# Patient Record
Sex: FEMALE | Race: WHITE | NOT HISPANIC OR LATINO | Employment: FULL TIME
[De-identification: names, ages, dates, MRNs, and addresses within clinical notes are randomized per-mention and may not be internally consistent; named-entity substitution may affect disease eponyms.]

---

## 2017-10-31 ENCOUNTER — HOSPITAL (OUTPATIENT)
Dept: OTHER | Age: 52
End: 2017-10-31
Attending: OBSTETRICS & GYNECOLOGY

## 2018-11-01 ENCOUNTER — HOSPITAL (OUTPATIENT)
Dept: OTHER | Age: 53
End: 2018-11-01
Attending: OBSTETRICS & GYNECOLOGY

## 2019-01-10 ENCOUNTER — HOSPITAL (OUTPATIENT)
Dept: OTHER | Age: 54
End: 2019-01-10
Attending: OBSTETRICS & GYNECOLOGY

## 2019-02-01 ENCOUNTER — DIAGNOSTIC TRANS (OUTPATIENT)
Dept: OTHER | Age: 54
End: 2019-02-01

## 2019-02-04 ENCOUNTER — HOSPITAL (OUTPATIENT)
Dept: OTHER | Age: 54
End: 2019-02-04

## 2019-02-05 ENCOUNTER — HOSPITAL (OUTPATIENT)
Dept: OTHER | Age: 54
End: 2019-02-05
Attending: OBSTETRICS & GYNECOLOGY

## 2019-02-05 LAB
HGB BLD-MCNC: 14.3 G/DL (ref 12–15.5)
HGB BLD-MCNC: 14.3 GM/DL (ref 12–15.5)

## 2019-02-06 LAB — PATHOLOGIST NAME: NORMAL

## 2019-11-06 ENCOUNTER — HOSPITAL (OUTPATIENT)
Dept: OTHER | Age: 54
End: 2019-11-06
Attending: OBSTETRICS & GYNECOLOGY

## 2019-11-12 ENCOUNTER — HOSPITAL (OUTPATIENT)
Dept: OTHER | Age: 54
End: 2019-11-12
Attending: OBSTETRICS & GYNECOLOGY

## 2020-12-03 ENCOUNTER — HOSPITAL ENCOUNTER (OUTPATIENT)
Dept: MAMMOGRAPHY | Age: 55
Discharge: HOME | End: 2020-12-03
Attending: OBSTETRICS & GYNECOLOGY

## 2020-12-03 DIAGNOSIS — Z12.31 ENCOUNTER FOR SCREENING MAMMOGRAM FOR MALIGNANT NEOPLASM OF BREAST: ICD-10-CM

## 2020-12-03 PROCEDURE — 77063 BREAST TOMOSYNTHESIS BI: CPT

## 2020-12-23 PROCEDURE — 88175 CYTOPATH C/V AUTO FLUID REDO: CPT | Performed by: CLINICAL MEDICAL LABORATORY

## 2020-12-23 PROCEDURE — 87624 HPV HI-RISK TYP POOLED RSLT: CPT | Performed by: CLINICAL MEDICAL LABORATORY

## 2020-12-24 ENCOUNTER — LAB REQUISITION (OUTPATIENT)
Dept: LAB | Age: 55
End: 2020-12-24

## 2020-12-24 DIAGNOSIS — Z12.4 ENCOUNTER FOR SCREENING FOR MALIGNANT NEOPLASM OF CERVIX: ICD-10-CM

## 2020-12-30 LAB
CASE RPRT: NORMAL
CYTOLOGY CVX/VAG STUDY: NORMAL
HPV16+18+45 E6+E7MRNA CVX NAA+PROBE: NEGATIVE
Lab: NORMAL
PAP EDUCATIONAL NOTE: NORMAL
SPECIMEN ADEQUACY: NORMAL

## 2021-01-01 ENCOUNTER — EXTERNAL RECORD (OUTPATIENT)
Dept: OTHER | Age: 56
End: 2021-01-01

## 2021-02-04 PROBLEM — L65.9 HAIR LOSS: Status: ACTIVE | Noted: 2021-02-04

## 2021-03-22 ENCOUNTER — EMPLOYEE HEALTH (OUTPATIENT)
Dept: OTHER | Age: 56
End: 2021-03-22

## 2021-03-22 DIAGNOSIS — Z20.822 SUSPECTED COVID-19 VIRUS INFECTION: Primary | ICD-10-CM

## 2021-03-22 LAB
SARS-COV-2 RNA SPEC QL NAA+PROBE: DETECTED
SPECIMEN SOURCE: ABNORMAL

## 2021-03-24 ENCOUNTER — EMPLOYEE HEALTH (OUTPATIENT)
Dept: OTHER | Age: 56
End: 2021-03-24

## 2021-03-28 ENCOUNTER — EMPLOYEE HEALTH (OUTPATIENT)
Dept: OTHER | Age: 56
End: 2021-03-28

## 2021-03-31 ENCOUNTER — EMPLOYEE HEALTH (OUTPATIENT)
Dept: OTHER | Age: 56
End: 2021-03-31

## 2021-08-16 ENCOUNTER — OFFICE VISIT (OUTPATIENT)
Dept: SURGERY | Facility: CLINIC | Age: 56
End: 2021-08-16
Payer: OTHER MISCELLANEOUS

## 2021-08-16 DIAGNOSIS — M79.641 PAIN IN RIGHT HAND: Primary | ICD-10-CM

## 2021-08-16 DIAGNOSIS — S63.501A SPRAIN OF RIGHT WRIST, INITIAL ENCOUNTER: ICD-10-CM

## 2021-08-16 PROCEDURE — 99204 OFFICE O/P NEW MOD 45 MIN: CPT | Performed by: PLASTIC SURGERY

## 2021-08-16 RX ORDER — INDOMETHACIN 25 MG/1
25 CAPSULE ORAL
Qty: 63 CAPSULE | Refills: 0 | Status: SHIPPED | OUTPATIENT
Start: 2021-08-16 | End: 2021-09-06

## 2021-08-16 NOTE — H&P
Zhen Gonsales is a 55year old female that presents with Patient presents with: Injury: Right hand      REFERRED BY:  No ref.  provider found    Pacemaker: No  Latex Allergy: no  Coumadin: No  Plavix: No  Other anticoagulants: No  Cardiac stents: No    HAND hand and forearm brought on by certain movement. Pt complains of numbness and tingling sensation, continues to wear wrist brace at night that is somewhat effective. Pt is on modified duty at work.            PMH:     MEDICAL  Past Medical History:   Diagnos wrist TFCC  Mild carpal tunnel    We discussed what a sprain is and the treatment options. This area may remain painful and stiff for a very long time (months, and in rare cases, permanently).   Questions were answered and the patient wishes to proceed with

## 2021-08-17 ENCOUNTER — TELEPHONE (OUTPATIENT)
Dept: SURGERY | Facility: CLINIC | Age: 56
End: 2021-08-17

## 2021-08-17 NOTE — TELEPHONE ENCOUNTER
We received a fax stating a prior authorization had been started for pt's Indomethacin, to submit the PA.   Spoke to Memo at [facility] pharmacy he stated Indomethacin that Dr Juan dominguez was ready for  and had been authorized, no further

## 2021-10-14 DIAGNOSIS — Z11.52 ENCOUNTER FOR SCREENING FOR COVID-19: ICD-10-CM

## 2021-10-16 ENCOUNTER — HOSPITAL ENCOUNTER (OUTPATIENT)
Dept: LAB | Age: 56
Discharge: STILL ADMITTED OR ADMINISTRATIVE | End: 2021-10-16
Attending: PREVENTIVE MEDICINE

## 2021-10-16 DIAGNOSIS — Z11.52 ENCOUNTER FOR SCREENING FOR COVID-19: ICD-10-CM

## 2021-10-16 LAB
SARS-COV-2 RNA RESP QL NAA+PROBE: NOT DETECTED
SERVICE CMNT-IMP: NORMAL
SERVICE CMNT-IMP: NORMAL

## 2021-10-16 PROCEDURE — U0005 INFEC AGEN DETEC AMPLI PROBE: HCPCS | Performed by: PSYCHIATRY & NEUROLOGY

## 2021-10-16 PROCEDURE — U0003 INFECTIOUS AGENT DETECTION BY NUCLEIC ACID (DNA OR RNA); SEVERE ACUTE RESPIRATORY SYNDROME CORONAVIRUS 2 (SARS-COV-2) (CORONAVIRUS DISEASE [COVID-19]), AMPLIFIED PROBE TECHNIQUE, MAKING USE OF HIGH THROUGHPUT TECHNOLOGIES AS DESCRIBED BY CMS-2020-01-R: HCPCS | Performed by: PSYCHIATRY & NEUROLOGY

## 2021-10-22 ENCOUNTER — TELEPHONE (OUTPATIENT)
Dept: SURGERY | Facility: CLINIC | Age: 56
End: 2021-10-22

## 2021-10-22 NOTE — TELEPHONE ENCOUNTER
Spoke with the patient, Dr. Riley Mitchell has reviewed her chart and recommends to see physiatry. Appointment canceled with our office. Pt has scheduled appointment 11/11 with physiatry.

## 2021-10-25 PROCEDURE — U0005 INFEC AGEN DETEC AMPLI PROBE: HCPCS | Performed by: PSYCHIATRY & NEUROLOGY

## 2021-10-25 PROCEDURE — U0003 INFECTIOUS AGENT DETECTION BY NUCLEIC ACID (DNA OR RNA); SEVERE ACUTE RESPIRATORY SYNDROME CORONAVIRUS 2 (SARS-COV-2) (CORONAVIRUS DISEASE [COVID-19]), AMPLIFIED PROBE TECHNIQUE, MAKING USE OF HIGH THROUGHPUT TECHNOLOGIES AS DESCRIBED BY CMS-2020-01-R: HCPCS | Performed by: PSYCHIATRY & NEUROLOGY

## 2021-10-26 ENCOUNTER — HOSPITAL ENCOUNTER (OUTPATIENT)
Dept: LAB | Age: 56
Discharge: STILL ADMITTED OR ADMINISTRATIVE | End: 2021-10-26
Attending: PREVENTIVE MEDICINE

## 2021-10-26 DIAGNOSIS — Z11.52 ENCOUNTER FOR SCREENING FOR COVID-19: ICD-10-CM

## 2021-10-26 LAB
SARS-COV-2 RNA RESP QL NAA+PROBE: NOT DETECTED
SERVICE CMNT-IMP: NORMAL
SERVICE CMNT-IMP: NORMAL

## 2021-11-01 ENCOUNTER — HOSPITAL ENCOUNTER (OUTPATIENT)
Dept: LAB | Age: 56
Discharge: STILL ADMITTED OR ADMINISTRATIVE | End: 2021-11-01
Attending: PREVENTIVE MEDICINE

## 2021-11-01 ENCOUNTER — OFFICE VISIT (OUTPATIENT)
Dept: PHYSICAL MEDICINE AND REHAB | Facility: CLINIC | Age: 56
End: 2021-11-01
Payer: COMMERCIAL

## 2021-11-01 ENCOUNTER — TELEPHONE (OUTPATIENT)
Dept: NEUROLOGY | Facility: CLINIC | Age: 56
End: 2021-11-01

## 2021-11-01 VITALS
WEIGHT: 190 LBS | HEART RATE: 87 BPM | DIASTOLIC BLOOD PRESSURE: 84 MMHG | HEIGHT: 62 IN | SYSTOLIC BLOOD PRESSURE: 120 MMHG | BODY MASS INDEX: 34.96 KG/M2 | OXYGEN SATURATION: 96 %

## 2021-11-01 DIAGNOSIS — G56.01 CARPAL TUNNEL SYNDROME OF RIGHT WRIST: Primary | ICD-10-CM

## 2021-11-01 DIAGNOSIS — Z11.52 ENCOUNTER FOR SCREENING FOR COVID-19: ICD-10-CM

## 2021-11-01 PROCEDURE — U0005 INFEC AGEN DETEC AMPLI PROBE: HCPCS | Performed by: PSYCHIATRY & NEUROLOGY

## 2021-11-01 PROCEDURE — U0003 INFECTIOUS AGENT DETECTION BY NUCLEIC ACID (DNA OR RNA); SEVERE ACUTE RESPIRATORY SYNDROME CORONAVIRUS 2 (SARS-COV-2) (CORONAVIRUS DISEASE [COVID-19]), AMPLIFIED PROBE TECHNIQUE, MAKING USE OF HIGH THROUGHPUT TECHNOLOGIES AS DESCRIBED BY CMS-2020-01-R: HCPCS | Performed by: PSYCHIATRY & NEUROLOGY

## 2021-11-01 PROCEDURE — 20526 THER INJECTION CARP TUNNEL: CPT | Performed by: PHYSICAL MEDICINE & REHABILITATION

## 2021-11-01 PROCEDURE — 3074F SYST BP LT 130 MM HG: CPT | Performed by: PHYSICAL MEDICINE & REHABILITATION

## 2021-11-01 PROCEDURE — 76942 ECHO GUIDE FOR BIOPSY: CPT | Performed by: PHYSICAL MEDICINE & REHABILITATION

## 2021-11-01 PROCEDURE — 99244 OFF/OP CNSLTJ NEW/EST MOD 40: CPT | Performed by: PHYSICAL MEDICINE & REHABILITATION

## 2021-11-01 PROCEDURE — 3008F BODY MASS INDEX DOCD: CPT | Performed by: PHYSICAL MEDICINE & REHABILITATION

## 2021-11-01 PROCEDURE — 3079F DIAST BP 80-89 MM HG: CPT | Performed by: PHYSICAL MEDICINE & REHABILITATION

## 2021-11-01 RX ORDER — LIDOCAINE HYDROCHLORIDE 10 MG/ML
1.5 INJECTION, SOLUTION INFILTRATION; PERINEURAL ONCE
Status: COMPLETED | OUTPATIENT
Start: 2021-11-01 | End: 2021-11-01

## 2021-11-01 RX ORDER — TRIAMCINOLONE ACETONIDE 40 MG/ML
20 INJECTION, SUSPENSION INTRA-ARTICULAR; INTRAMUSCULAR ONCE
Status: COMPLETED | OUTPATIENT
Start: 2021-11-01 | End: 2021-11-01

## 2021-11-01 NOTE — PATIENT INSTRUCTIONS
-Start occupational therapy and home exercises  -Continue night splints  -Ice/Heat as tolerated  -Topical Voltaren gel as needed  -If no better will consider MRI for further evaluation and EMG test  -Follow up in 4 weeks    Steroid Injection Information  W

## 2021-11-01 NOTE — TELEPHONE ENCOUNTER
[employer] for authorization of approval for  Ultrasound guided right carpal tunnel injection with corticosteroid cpt codes 36885, [id number], . Authorization is not required. Transaction ID: [id number]. Will inform Nursing.

## 2021-11-02 PROBLEM — G56.01 CARPAL TUNNEL SYNDROME OF RIGHT WRIST: Status: ACTIVE | Noted: 2021-11-02

## 2021-11-02 LAB
SARS-COV-2 RNA RESP QL NAA+PROBE: NOT DETECTED
SERVICE CMNT-IMP: NORMAL
SERVICE CMNT-IMP: NORMAL

## 2021-11-02 NOTE — PROCEDURES
Procedure:  Ultrasound guided RIGHT carpal tunnel injection  The risks, benefits and anticipated outcomes of the procedure, the risks and benefits of the alternatives to the procedure, and the roles and tasks of the personnel to be involved, were discussed

## 2021-11-02 NOTE — PROGRESS NOTES
[address] [facility]  NEW PATIENT EVALUATION    Consultation as a request of Dr. Chayito Ulrich    Chief Complaint: Hand pain    HISTORY OF PRESENT ILLNESS:   Patient presents with:  Hand Pain: New R handed patient,  2/10 Family History   Problem Relation Age of Onset   • Hypertension Mother           SOCIAL HISTORY:   Social History    Tobacco Use      Smoking status: Never Smoker      Smokeless tobacco: Never Used    Alcohol use: No    Drug use: No         REVIEW OF S Exam:    WRIST:  Inspection: no erythema, swelling, or obvious deformity  Palpation: no ttp in carpal bones or areas of pain  ROM: intact to all planes of motion  Strength: 5/5  Sensation: Intact to light touch in all dermatomes of the lower extremities  T barriers to learning. Nicolle ZALDIVAR [address] [facility]  Physical Medicine and Rehabilitation/Sports Medicine

## 2021-11-05 ENCOUNTER — HOSPITAL ENCOUNTER (OUTPATIENT)
Dept: LAB | Age: 56
Discharge: STILL ADMITTED OR ADMINISTRATIVE | End: 2021-11-05
Attending: PREVENTIVE MEDICINE

## 2021-11-05 DIAGNOSIS — Z11.52 ENCOUNTER FOR SCREENING FOR COVID-19: ICD-10-CM

## 2021-11-05 PROCEDURE — U0003 INFECTIOUS AGENT DETECTION BY NUCLEIC ACID (DNA OR RNA); SEVERE ACUTE RESPIRATORY SYNDROME CORONAVIRUS 2 (SARS-COV-2) (CORONAVIRUS DISEASE [COVID-19]), AMPLIFIED PROBE TECHNIQUE, MAKING USE OF HIGH THROUGHPUT TECHNOLOGIES AS DESCRIBED BY CMS-2020-01-R: HCPCS | Performed by: PSYCHIATRY & NEUROLOGY

## 2021-11-05 PROCEDURE — U0005 INFEC AGEN DETEC AMPLI PROBE: HCPCS | Performed by: PSYCHIATRY & NEUROLOGY

## 2021-11-06 LAB
SARS-COV-2 RNA RESP QL NAA+PROBE: NOT DETECTED
SERVICE CMNT-IMP: NORMAL
SERVICE CMNT-IMP: NORMAL

## 2021-11-11 ENCOUNTER — MED REC SCAN ONLY (OUTPATIENT)
Dept: PHYSICAL MEDICINE AND REHAB | Facility: CLINIC | Age: 56
End: 2021-11-11

## 2021-11-12 ENCOUNTER — HOSPITAL ENCOUNTER (OUTPATIENT)
Dept: LAB | Age: 56
Discharge: STILL ADMITTED OR ADMINISTRATIVE | End: 2021-11-12
Attending: PREVENTIVE MEDICINE

## 2021-11-12 DIAGNOSIS — Z11.52 ENCOUNTER FOR SCREENING FOR COVID-19: ICD-10-CM

## 2021-11-12 LAB
SARS-COV-2 RNA RESP QL NAA+PROBE: NOT DETECTED
SERVICE CMNT-IMP: NORMAL
SERVICE CMNT-IMP: NORMAL

## 2021-11-12 PROCEDURE — U0005 INFEC AGEN DETEC AMPLI PROBE: HCPCS | Performed by: PSYCHIATRY & NEUROLOGY

## 2021-11-12 PROCEDURE — U0003 INFECTIOUS AGENT DETECTION BY NUCLEIC ACID (DNA OR RNA); SEVERE ACUTE RESPIRATORY SYNDROME CORONAVIRUS 2 (SARS-COV-2) (CORONAVIRUS DISEASE [COVID-19]), AMPLIFIED PROBE TECHNIQUE, MAKING USE OF HIGH THROUGHPUT TECHNOLOGIES AS DESCRIBED BY CMS-2020-01-R: HCPCS | Performed by: PSYCHIATRY & NEUROLOGY

## 2021-11-17 ENCOUNTER — HOSPITAL ENCOUNTER (OUTPATIENT)
Dept: MAMMOGRAPHY | Age: 56
Discharge: HOME | End: 2021-11-17
Attending: OBSTETRICS & GYNECOLOGY

## 2021-11-17 DIAGNOSIS — Z12.31 ENCOUNTER FOR SCREENING MAMMOGRAM FOR MALIGNANT NEOPLASM OF BREAST: ICD-10-CM

## 2021-11-17 PROCEDURE — 77063 BREAST TOMOSYNTHESIS BI: CPT

## 2021-11-20 ENCOUNTER — HOSPITAL ENCOUNTER (OUTPATIENT)
Dept: LAB | Age: 56
Discharge: STILL ADMITTED OR ADMINISTRATIVE | End: 2021-11-20
Attending: PREVENTIVE MEDICINE

## 2021-11-20 DIAGNOSIS — Z11.52 ENCOUNTER FOR SCREENING FOR COVID-19: ICD-10-CM

## 2021-11-20 LAB
SARS-COV-2 RNA RESP QL NAA+PROBE: NOT DETECTED
SERVICE CMNT-IMP: NORMAL
SERVICE CMNT-IMP: NORMAL

## 2021-11-20 PROCEDURE — U0005 INFEC AGEN DETEC AMPLI PROBE: HCPCS | Performed by: PSYCHIATRY & NEUROLOGY

## 2021-11-20 PROCEDURE — U0003 INFECTIOUS AGENT DETECTION BY NUCLEIC ACID (DNA OR RNA); SEVERE ACUTE RESPIRATORY SYNDROME CORONAVIRUS 2 (SARS-COV-2) (CORONAVIRUS DISEASE [COVID-19]), AMPLIFIED PROBE TECHNIQUE, MAKING USE OF HIGH THROUGHPUT TECHNOLOGIES AS DESCRIBED BY CMS-2020-01-R: HCPCS | Performed by: PSYCHIATRY & NEUROLOGY

## 2021-11-26 ENCOUNTER — HOSPITAL ENCOUNTER (OUTPATIENT)
Dept: LAB | Age: 56
Discharge: STILL ADMITTED OR ADMINISTRATIVE | End: 2021-11-26
Attending: PREVENTIVE MEDICINE

## 2021-11-26 DIAGNOSIS — Z11.52 ENCOUNTER FOR SCREENING FOR COVID-19: ICD-10-CM

## 2021-11-26 PROCEDURE — U0005 INFEC AGEN DETEC AMPLI PROBE: HCPCS | Performed by: PSYCHIATRY & NEUROLOGY

## 2021-11-26 PROCEDURE — U0003 INFECTIOUS AGENT DETECTION BY NUCLEIC ACID (DNA OR RNA); SEVERE ACUTE RESPIRATORY SYNDROME CORONAVIRUS 2 (SARS-COV-2) (CORONAVIRUS DISEASE [COVID-19]), AMPLIFIED PROBE TECHNIQUE, MAKING USE OF HIGH THROUGHPUT TECHNOLOGIES AS DESCRIBED BY CMS-2020-01-R: HCPCS | Performed by: PSYCHIATRY & NEUROLOGY

## 2021-11-27 LAB
SARS-COV-2 RNA RESP QL NAA+PROBE: NOT DETECTED
SERVICE CMNT-IMP: NORMAL
SERVICE CMNT-IMP: NORMAL

## 2021-12-03 ENCOUNTER — HOSPITAL ENCOUNTER (OUTPATIENT)
Dept: LAB | Age: 56
Discharge: STILL ADMITTED OR ADMINISTRATIVE | End: 2021-12-03
Attending: PREVENTIVE MEDICINE

## 2021-12-03 DIAGNOSIS — Z11.52 ENCOUNTER FOR SCREENING FOR COVID-19: ICD-10-CM

## 2021-12-03 LAB
SARS-COV-2 RNA RESP QL NAA+PROBE: NOT DETECTED
SERVICE CMNT-IMP: NORMAL
SERVICE CMNT-IMP: NORMAL

## 2021-12-03 PROCEDURE — U0005 INFEC AGEN DETEC AMPLI PROBE: HCPCS | Performed by: PSYCHIATRY & NEUROLOGY

## 2021-12-03 PROCEDURE — U0003 INFECTIOUS AGENT DETECTION BY NUCLEIC ACID (DNA OR RNA); SEVERE ACUTE RESPIRATORY SYNDROME CORONAVIRUS 2 (SARS-COV-2) (CORONAVIRUS DISEASE [COVID-19]), AMPLIFIED PROBE TECHNIQUE, MAKING USE OF HIGH THROUGHPUT TECHNOLOGIES AS DESCRIBED BY CMS-2020-01-R: HCPCS | Performed by: PSYCHIATRY & NEUROLOGY

## 2021-12-11 ENCOUNTER — HOSPITAL ENCOUNTER (OUTPATIENT)
Dept: LAB | Age: 56
Discharge: STILL ADMITTED OR ADMINISTRATIVE | End: 2021-12-11
Attending: PREVENTIVE MEDICINE

## 2021-12-11 DIAGNOSIS — Z11.52 ENCOUNTER FOR SCREENING FOR COVID-19: ICD-10-CM

## 2021-12-11 PROCEDURE — U0003 INFECTIOUS AGENT DETECTION BY NUCLEIC ACID (DNA OR RNA); SEVERE ACUTE RESPIRATORY SYNDROME CORONAVIRUS 2 (SARS-COV-2) (CORONAVIRUS DISEASE [COVID-19]), AMPLIFIED PROBE TECHNIQUE, MAKING USE OF HIGH THROUGHPUT TECHNOLOGIES AS DESCRIBED BY CMS-2020-01-R: HCPCS | Performed by: PSYCHIATRY & NEUROLOGY

## 2021-12-11 PROCEDURE — U0005 INFEC AGEN DETEC AMPLI PROBE: HCPCS | Performed by: PSYCHIATRY & NEUROLOGY

## 2021-12-12 LAB
SARS-COV-2 RNA RESP QL NAA+PROBE: NOT DETECTED
SERVICE CMNT-IMP: NORMAL
SERVICE CMNT-IMP: NORMAL

## 2021-12-21 ENCOUNTER — LAB REQUISITION (OUTPATIENT)
Dept: LAB | Age: 56
End: 2021-12-21

## 2021-12-21 DIAGNOSIS — Z12.4 ENCOUNTER FOR SCREENING FOR MALIGNANT NEOPLASM OF CERVIX: ICD-10-CM

## 2021-12-21 DIAGNOSIS — R63.5 ABNORMAL WEIGHT GAIN: ICD-10-CM

## 2021-12-21 DIAGNOSIS — Z13.228 ENCOUNTER FOR SCREENING FOR OTHER METABOLIC DISORDERS: ICD-10-CM

## 2021-12-21 DIAGNOSIS — Z13.0 ENCOUNTER FOR SCREENING FOR DISEASES OF THE BLOOD AND BLOOD-FORMING ORGANS AND CERTAIN DISORDERS INVOLVING THE IMMUNE MECHANISM: ICD-10-CM

## 2021-12-21 DIAGNOSIS — Z13.220 ENCOUNTER FOR SCREENING FOR LIPOID DISORDERS: ICD-10-CM

## 2021-12-21 PROCEDURE — 87624 HPV HI-RISK TYP POOLED RSLT: CPT | Performed by: CLINICAL MEDICAL LABORATORY

## 2021-12-21 PROCEDURE — 88175 CYTOPATH C/V AUTO FLUID REDO: CPT | Performed by: CLINICAL MEDICAL LABORATORY

## 2021-12-27 ENCOUNTER — HOSPITAL ENCOUNTER (OUTPATIENT)
Dept: LAB | Age: 56
Discharge: STILL ADMITTED OR ADMINISTRATIVE | End: 2021-12-27
Attending: PREVENTIVE MEDICINE

## 2021-12-27 DIAGNOSIS — Z11.52 ENCOUNTER FOR SCREENING FOR COVID-19: ICD-10-CM

## 2021-12-27 LAB
SARS-COV-2 RNA RESP QL NAA+PROBE: NOT DETECTED
SERVICE CMNT-IMP: NORMAL
SERVICE CMNT-IMP: NORMAL

## 2021-12-27 PROCEDURE — U0005 INFEC AGEN DETEC AMPLI PROBE: HCPCS | Performed by: PSYCHIATRY & NEUROLOGY

## 2021-12-27 PROCEDURE — U0003 INFECTIOUS AGENT DETECTION BY NUCLEIC ACID (DNA OR RNA); SEVERE ACUTE RESPIRATORY SYNDROME CORONAVIRUS 2 (SARS-COV-2) (CORONAVIRUS DISEASE [COVID-19]), AMPLIFIED PROBE TECHNIQUE, MAKING USE OF HIGH THROUGHPUT TECHNOLOGIES AS DESCRIBED BY CMS-2020-01-R: HCPCS | Performed by: PSYCHIATRY & NEUROLOGY

## 2021-12-28 LAB
CASE RPRT: ABNORMAL
CLINICAL INFO: ABNORMAL
CYTOLOGY CVX/VAG STUDY: ABNORMAL
CYTOLOGY CVX/VAG STUDY: ABNORMAL
GEN CATEG CVX/VAG CYTO-IMP: ABNORMAL
HPV16+18+45 E6+E7MRNA CVX NAA+PROBE: NEGATIVE
Lab: NORMAL
PAP EDUCATIONAL NOTE: ABNORMAL
SPECIMEN ADEQUACY: ABNORMAL

## 2022-01-03 ENCOUNTER — HOSPITAL ENCOUNTER (OUTPATIENT)
Dept: LAB | Age: 57
Discharge: STILL ADMITTED OR ADMINISTRATIVE | End: 2022-01-03
Attending: PREVENTIVE MEDICINE

## 2022-01-03 DIAGNOSIS — Z11.52 ENCOUNTER FOR SCREENING FOR COVID-19: ICD-10-CM

## 2022-01-03 LAB
SARS-COV-2 RNA RESP QL NAA+PROBE: NOT DETECTED
SERVICE CMNT-IMP: NORMAL
SERVICE CMNT-IMP: NORMAL

## 2022-01-03 PROCEDURE — U0003 INFECTIOUS AGENT DETECTION BY NUCLEIC ACID (DNA OR RNA); SEVERE ACUTE RESPIRATORY SYNDROME CORONAVIRUS 2 (SARS-COV-2) (CORONAVIRUS DISEASE [COVID-19]), AMPLIFIED PROBE TECHNIQUE, MAKING USE OF HIGH THROUGHPUT TECHNOLOGIES AS DESCRIBED BY CMS-2020-01-R: HCPCS | Performed by: PSYCHIATRY & NEUROLOGY

## 2022-01-03 PROCEDURE — U0005 INFEC AGEN DETEC AMPLI PROBE: HCPCS | Performed by: PSYCHIATRY & NEUROLOGY

## 2022-01-10 ENCOUNTER — HOSPITAL ENCOUNTER (OUTPATIENT)
Dept: LAB | Age: 57
Discharge: STILL ADMITTED OR ADMINISTRATIVE | End: 2022-01-10
Attending: PREVENTIVE MEDICINE

## 2022-01-10 DIAGNOSIS — Z11.52 ENCOUNTER FOR SCREENING FOR COVID-19: ICD-10-CM

## 2022-01-10 LAB
SARS-COV-2 RNA RESP QL NAA+PROBE: NOT DETECTED
SERVICE CMNT-IMP: NORMAL
SERVICE CMNT-IMP: NORMAL

## 2022-01-10 PROCEDURE — U0003 INFECTIOUS AGENT DETECTION BY NUCLEIC ACID (DNA OR RNA); SEVERE ACUTE RESPIRATORY SYNDROME CORONAVIRUS 2 (SARS-COV-2) (CORONAVIRUS DISEASE [COVID-19]), AMPLIFIED PROBE TECHNIQUE, MAKING USE OF HIGH THROUGHPUT TECHNOLOGIES AS DESCRIBED BY CMS-2020-01-R: HCPCS | Performed by: PSYCHIATRY & NEUROLOGY

## 2022-01-10 PROCEDURE — U0005 INFEC AGEN DETEC AMPLI PROBE: HCPCS | Performed by: PSYCHIATRY & NEUROLOGY

## 2022-01-14 ENCOUNTER — LAB SERVICES (OUTPATIENT)
Dept: LAB | Age: 57
End: 2022-01-14

## 2022-01-14 ENCOUNTER — LAB REQUISITION (OUTPATIENT)
Dept: LAB | Age: 57
End: 2022-01-14

## 2022-01-14 DIAGNOSIS — N90.89 OTHER SPECIFIED NONINFLAMMATORY DISORDERS OF VULVA AND PERINEUM: ICD-10-CM

## 2022-01-14 DIAGNOSIS — R87.610 ATYPICAL SQUAMOUS CELLS OF UNDETERMINED SIGNIFICANCE ON CYTOLOGIC SMEAR OF CERVIX (ASC-US): ICD-10-CM

## 2022-01-14 LAB
ALBUMIN SERPL-MCNC: 3.7 G/DL (ref 3.6–5.1)
ALBUMIN/GLOB SERPL: 1 {RATIO} (ref 1–2.4)
ALP SERPL-CCNC: 80 UNITS/L (ref 45–117)
ALT SERPL-CCNC: 24 UNITS/L
ANION GAP SERPL CALC-SCNC: 10 MMOL/L (ref 10–20)
AST SERPL-CCNC: 17 UNITS/L
BASOPHILS # BLD: 0 K/MCL (ref 0–0.3)
BASOPHILS NFR BLD: 1 %
BILIRUB SERPL-MCNC: 1.5 MG/DL (ref 0.2–1)
BUN SERPL-MCNC: 11 MG/DL (ref 6–20)
BUN/CREAT SERPL: 12 (ref 7–25)
CALCIUM SERPL-MCNC: 9.4 MG/DL (ref 8.4–10.2)
CHLORIDE SERPL-SCNC: 107 MMOL/L (ref 98–107)
CHOLEST SERPL-MCNC: 244 MG/DL
CHOLEST/HDLC SERPL: 4.5 {RATIO}
CO2 SERPL-SCNC: 28 MMOL/L (ref 21–32)
CREAT SERPL-MCNC: 0.89 MG/DL (ref 0.51–0.95)
DEPRECATED RDW RBC: 40.2 FL (ref 39–50)
EOSINOPHIL # BLD: 0.1 K/MCL (ref 0–0.5)
EOSINOPHIL NFR BLD: 1 %
ERYTHROCYTE [DISTWIDTH] IN BLOOD: 12.1 % (ref 11–15)
FASTING DURATION TIME PATIENT: 8 HOURS (ref 0–999)
FASTING DURATION TIME PATIENT: 8 HOURS (ref 0–999)
GFR SERPLBLD BASED ON 1.73 SQ M-ARVRAT: 73 ML/MIN
GLOBULIN SER-MCNC: 3.6 G/DL (ref 2–4)
GLUCOSE SERPL-MCNC: 91 MG/DL (ref 70–99)
HBA1C MFR BLD: 5.7 % (ref 4.5–5.6)
HCT VFR BLD CALC: 43.9 % (ref 36–46.5)
HDLC SERPL-MCNC: 54 MG/DL
HGB BLD-MCNC: 14.4 G/DL (ref 12–15.5)
IMM GRANULOCYTES # BLD AUTO: 0 K/MCL (ref 0–0.2)
IMM GRANULOCYTES # BLD: 0 %
LDLC SERPL CALC-MCNC: 169 MG/DL
LYMPHOCYTES # BLD: 1.6 K/MCL (ref 1–4)
LYMPHOCYTES NFR BLD: 29 %
MCH RBC QN AUTO: 29.8 PG (ref 26–34)
MCHC RBC AUTO-ENTMCNC: 32.8 G/DL (ref 32–36.5)
MCV RBC AUTO: 90.9 FL (ref 78–100)
MONOCYTES # BLD: 0.4 K/MCL (ref 0.3–0.9)
MONOCYTES NFR BLD: 7 %
NEUTROPHILS # BLD: 3.4 K/MCL (ref 1.8–7.7)
NEUTROPHILS NFR BLD: 62 %
NONHDLC SERPL-MCNC: 190 MG/DL
NRBC BLD MANUAL-RTO: 0 /100 WBC
PLATELET # BLD AUTO: 242 K/MCL (ref 140–450)
POTASSIUM SERPL-SCNC: 4.4 MMOL/L (ref 3.4–5.1)
PROT SERPL-MCNC: 7.3 G/DL (ref 6.4–8.2)
RBC # BLD: 4.83 MIL/MCL (ref 4–5.2)
SODIUM SERPL-SCNC: 141 MMOL/L (ref 135–145)
TRIGL SERPL-MCNC: 105 MG/DL
TSH SERPL-ACNC: 1.67 MCUNITS/ML (ref 0.35–5)
WBC # BLD: 5.5 K/MCL (ref 4.2–11)

## 2022-01-14 PROCEDURE — 83036 HEMOGLOBIN GLYCOSYLATED A1C: CPT | Performed by: CLINICAL MEDICAL LABORATORY

## 2022-01-14 PROCEDURE — 88305 TISSUE EXAM BY PATHOLOGIST: CPT | Performed by: CLINICAL MEDICAL LABORATORY

## 2022-01-14 PROCEDURE — 83525 ASSAY OF INSULIN: CPT | Performed by: CLINICAL MEDICAL LABORATORY

## 2022-01-14 PROCEDURE — 80050 GENERAL HEALTH PANEL: CPT | Performed by: CLINICAL MEDICAL LABORATORY

## 2022-01-14 PROCEDURE — 36415 COLL VENOUS BLD VENIPUNCTURE: CPT | Performed by: CLINICAL MEDICAL LABORATORY

## 2022-01-14 PROCEDURE — 80061 LIPID PANEL: CPT | Performed by: CLINICAL MEDICAL LABORATORY

## 2022-01-15 LAB
FASTING DURATION TIME PATIENT: NORMAL H
INSULIN P FAST SERPL-ACNC: 13 MUNITS/L (ref 3–28)

## 2022-01-17 ENCOUNTER — HOSPITAL ENCOUNTER (OUTPATIENT)
Dept: LAB | Age: 57
Discharge: STILL ADMITTED OR ADMINISTRATIVE | End: 2022-01-17
Attending: PREVENTIVE MEDICINE

## 2022-01-17 DIAGNOSIS — Z11.52 ENCOUNTER FOR SCREENING FOR COVID-19: ICD-10-CM

## 2022-01-17 LAB
SARS-COV-2 RNA RESP QL NAA+PROBE: NOT DETECTED
SERVICE CMNT-IMP: NORMAL
SERVICE CMNT-IMP: NORMAL

## 2022-01-17 PROCEDURE — U0005 INFEC AGEN DETEC AMPLI PROBE: HCPCS | Performed by: PSYCHIATRY & NEUROLOGY

## 2022-01-17 PROCEDURE — U0003 INFECTIOUS AGENT DETECTION BY NUCLEIC ACID (DNA OR RNA); SEVERE ACUTE RESPIRATORY SYNDROME CORONAVIRUS 2 (SARS-COV-2) (CORONAVIRUS DISEASE [COVID-19]), AMPLIFIED PROBE TECHNIQUE, MAKING USE OF HIGH THROUGHPUT TECHNOLOGIES AS DESCRIBED BY CMS-2020-01-R: HCPCS | Performed by: PSYCHIATRY & NEUROLOGY

## 2022-01-20 LAB
ASR DISCLAIMER: NORMAL
CASE RPRT: NORMAL
CLINICAL INFO: NORMAL
PATH REPORT.FINAL DX SPEC: NORMAL
PATH REPORT.GROSS SPEC: NORMAL

## 2022-01-24 ENCOUNTER — HOSPITAL ENCOUNTER (OUTPATIENT)
Dept: LAB | Age: 57
Discharge: STILL ADMITTED OR ADMINISTRATIVE | End: 2022-01-24
Attending: PREVENTIVE MEDICINE

## 2022-01-24 DIAGNOSIS — Z11.52 ENCOUNTER FOR SCREENING FOR COVID-19: ICD-10-CM

## 2022-01-24 LAB
SARS-COV-2 RNA RESP QL NAA+PROBE: NOT DETECTED
SERVICE CMNT-IMP: NORMAL
SERVICE CMNT-IMP: NORMAL

## 2022-01-24 PROCEDURE — U0005 INFEC AGEN DETEC AMPLI PROBE: HCPCS | Performed by: PSYCHIATRY & NEUROLOGY

## 2022-01-24 PROCEDURE — U0003 INFECTIOUS AGENT DETECTION BY NUCLEIC ACID (DNA OR RNA); SEVERE ACUTE RESPIRATORY SYNDROME CORONAVIRUS 2 (SARS-COV-2) (CORONAVIRUS DISEASE [COVID-19]), AMPLIFIED PROBE TECHNIQUE, MAKING USE OF HIGH THROUGHPUT TECHNOLOGIES AS DESCRIBED BY CMS-2020-01-R: HCPCS | Performed by: PSYCHIATRY & NEUROLOGY

## 2022-01-31 ENCOUNTER — HOSPITAL ENCOUNTER (OUTPATIENT)
Dept: LAB | Age: 57
Discharge: STILL ADMITTED OR ADMINISTRATIVE | End: 2022-01-31
Attending: PREVENTIVE MEDICINE

## 2022-01-31 DIAGNOSIS — Z11.52 ENCOUNTER FOR SCREENING FOR COVID-19: ICD-10-CM

## 2022-01-31 LAB
SARS-COV-2 RNA RESP QL NAA+PROBE: NOT DETECTED
SERVICE CMNT-IMP: NORMAL
SERVICE CMNT-IMP: NORMAL

## 2022-01-31 PROCEDURE — U0005 INFEC AGEN DETEC AMPLI PROBE: HCPCS | Performed by: PSYCHIATRY & NEUROLOGY

## 2022-01-31 PROCEDURE — U0003 INFECTIOUS AGENT DETECTION BY NUCLEIC ACID (DNA OR RNA); SEVERE ACUTE RESPIRATORY SYNDROME CORONAVIRUS 2 (SARS-COV-2) (CORONAVIRUS DISEASE [COVID-19]), AMPLIFIED PROBE TECHNIQUE, MAKING USE OF HIGH THROUGHPUT TECHNOLOGIES AS DESCRIBED BY CMS-2020-01-R: HCPCS | Performed by: PSYCHIATRY & NEUROLOGY

## 2022-02-09 ENCOUNTER — HOSPITAL ENCOUNTER (OUTPATIENT)
Dept: LAB | Age: 57
Discharge: STILL ADMITTED OR ADMINISTRATIVE | End: 2022-02-09
Attending: PREVENTIVE MEDICINE

## 2022-02-09 DIAGNOSIS — Z11.52 ENCOUNTER FOR SCREENING FOR COVID-19: ICD-10-CM

## 2022-02-09 LAB
SARS-COV-2 RNA RESP QL NAA+PROBE: NOT DETECTED
SERVICE CMNT-IMP: NORMAL
SERVICE CMNT-IMP: NORMAL

## 2022-02-09 PROCEDURE — U0003 INFECTIOUS AGENT DETECTION BY NUCLEIC ACID (DNA OR RNA); SEVERE ACUTE RESPIRATORY SYNDROME CORONAVIRUS 2 (SARS-COV-2) (CORONAVIRUS DISEASE [COVID-19]), AMPLIFIED PROBE TECHNIQUE, MAKING USE OF HIGH THROUGHPUT TECHNOLOGIES AS DESCRIBED BY CMS-2020-01-R: HCPCS | Performed by: PSYCHIATRY & NEUROLOGY

## 2022-02-09 PROCEDURE — U0005 INFEC AGEN DETEC AMPLI PROBE: HCPCS | Performed by: PSYCHIATRY & NEUROLOGY

## 2022-02-14 ENCOUNTER — HOSPITAL ENCOUNTER (OUTPATIENT)
Dept: LAB | Age: 57
Discharge: STILL ADMITTED OR ADMINISTRATIVE | End: 2022-02-14
Attending: PREVENTIVE MEDICINE

## 2022-02-14 DIAGNOSIS — Z11.52 ENCOUNTER FOR SCREENING FOR COVID-19: ICD-10-CM

## 2022-02-14 LAB
SARS-COV-2 RNA RESP QL NAA+PROBE: NOT DETECTED
SERVICE CMNT-IMP: NORMAL
SERVICE CMNT-IMP: NORMAL

## 2022-02-14 PROCEDURE — U0005 INFEC AGEN DETEC AMPLI PROBE: HCPCS | Performed by: PSYCHIATRY & NEUROLOGY

## 2022-02-14 PROCEDURE — U0003 INFECTIOUS AGENT DETECTION BY NUCLEIC ACID (DNA OR RNA); SEVERE ACUTE RESPIRATORY SYNDROME CORONAVIRUS 2 (SARS-COV-2) (CORONAVIRUS DISEASE [COVID-19]), AMPLIFIED PROBE TECHNIQUE, MAKING USE OF HIGH THROUGHPUT TECHNOLOGIES AS DESCRIBED BY CMS-2020-01-R: HCPCS | Performed by: PSYCHIATRY & NEUROLOGY

## 2022-02-21 ENCOUNTER — TELEPHONE (OUTPATIENT)
Dept: NEUROLOGY | Facility: CLINIC | Age: 57
End: 2022-02-21

## 2022-02-22 ENCOUNTER — HOSPITAL ENCOUNTER (OUTPATIENT)
Dept: LAB | Age: 57
Discharge: STILL ADMITTED OR ADMINISTRATIVE | End: 2022-02-22
Attending: PREVENTIVE MEDICINE

## 2022-02-22 DIAGNOSIS — Z11.52 ENCOUNTER FOR SCREENING FOR COVID-19: ICD-10-CM

## 2022-02-22 LAB
SARS-COV-2 RNA RESP QL NAA+PROBE: NOT DETECTED
SERVICE CMNT-IMP: NORMAL
SERVICE CMNT-IMP: NORMAL

## 2022-02-22 PROCEDURE — U0003 INFECTIOUS AGENT DETECTION BY NUCLEIC ACID (DNA OR RNA); SEVERE ACUTE RESPIRATORY SYNDROME CORONAVIRUS 2 (SARS-COV-2) (CORONAVIRUS DISEASE [COVID-19]), AMPLIFIED PROBE TECHNIQUE, MAKING USE OF HIGH THROUGHPUT TECHNOLOGIES AS DESCRIBED BY CMS-2020-01-R: HCPCS | Performed by: PSYCHIATRY & NEUROLOGY

## 2022-02-22 PROCEDURE — U0005 INFEC AGEN DETEC AMPLI PROBE: HCPCS | Performed by: PSYCHIATRY & NEUROLOGY

## 2022-03-05 ENCOUNTER — HOSPITAL ENCOUNTER (OUTPATIENT)
Dept: LAB | Age: 57
Discharge: STILL ADMITTED OR ADMINISTRATIVE | End: 2022-03-05
Attending: PREVENTIVE MEDICINE

## 2022-03-05 DIAGNOSIS — Z11.52 ENCOUNTER FOR SCREENING FOR COVID-19: ICD-10-CM

## 2022-03-05 LAB
SARS-COV-2 RNA RESP QL NAA+PROBE: NOT DETECTED
SERVICE CMNT-IMP: NORMAL
SERVICE CMNT-IMP: NORMAL

## 2022-03-05 PROCEDURE — U0003 INFECTIOUS AGENT DETECTION BY NUCLEIC ACID (DNA OR RNA); SEVERE ACUTE RESPIRATORY SYNDROME CORONAVIRUS 2 (SARS-COV-2) (CORONAVIRUS DISEASE [COVID-19]), AMPLIFIED PROBE TECHNIQUE, MAKING USE OF HIGH THROUGHPUT TECHNOLOGIES AS DESCRIBED BY CMS-2020-01-R: HCPCS | Performed by: PSYCHIATRY & NEUROLOGY

## 2022-03-05 PROCEDURE — U0005 INFEC AGEN DETEC AMPLI PROBE: HCPCS | Performed by: PSYCHIATRY & NEUROLOGY

## 2022-03-15 ENCOUNTER — HOSPITAL ENCOUNTER (OUTPATIENT)
Dept: LAB | Age: 57
Discharge: STILL ADMITTED OR ADMINISTRATIVE | End: 2022-03-15
Attending: PREVENTIVE MEDICINE

## 2022-03-15 DIAGNOSIS — Z11.52 ENCOUNTER FOR SCREENING FOR COVID-19: ICD-10-CM

## 2022-03-15 LAB
SARS-COV-2 RNA RESP QL NAA+PROBE: NOT DETECTED
SERVICE CMNT-IMP: NORMAL
SERVICE CMNT-IMP: NORMAL

## 2022-03-15 PROCEDURE — U0003 INFECTIOUS AGENT DETECTION BY NUCLEIC ACID (DNA OR RNA); SEVERE ACUTE RESPIRATORY SYNDROME CORONAVIRUS 2 (SARS-COV-2) (CORONAVIRUS DISEASE [COVID-19]), AMPLIFIED PROBE TECHNIQUE, MAKING USE OF HIGH THROUGHPUT TECHNOLOGIES AS DESCRIBED BY CMS-2020-01-R: HCPCS | Performed by: PSYCHIATRY & NEUROLOGY

## 2022-03-15 PROCEDURE — U0005 INFEC AGEN DETEC AMPLI PROBE: HCPCS | Performed by: PSYCHIATRY & NEUROLOGY

## 2022-03-26 ENCOUNTER — HOSPITAL ENCOUNTER (OUTPATIENT)
Dept: LAB | Age: 57
Discharge: STILL ADMITTED OR ADMINISTRATIVE | End: 2022-03-26
Attending: PREVENTIVE MEDICINE

## 2022-03-26 DIAGNOSIS — Z11.52 ENCOUNTER FOR SCREENING FOR COVID-19: ICD-10-CM

## 2022-03-26 LAB
FLUAV RNA RESP QL NAA+PROBE: NOT DETECTED
FLUBV RNA RESP QL NAA+PROBE: NOT DETECTED
SARS-COV-2 RNA RESP QL NAA+PROBE: NOT DETECTED
SERVICE CMNT-IMP: NORMAL
SERVICE CMNT-IMP: NORMAL

## 2022-03-26 PROCEDURE — U0003 INFECTIOUS AGENT DETECTION BY NUCLEIC ACID (DNA OR RNA); SEVERE ACUTE RESPIRATORY SYNDROME CORONAVIRUS 2 (SARS-COV-2) (CORONAVIRUS DISEASE [COVID-19]), AMPLIFIED PROBE TECHNIQUE, MAKING USE OF HIGH THROUGHPUT TECHNOLOGIES AS DESCRIBED BY CMS-2020-01-R: HCPCS | Performed by: INTERNAL MEDICINE

## 2022-03-26 PROCEDURE — U0005 INFEC AGEN DETEC AMPLI PROBE: HCPCS | Performed by: INTERNAL MEDICINE

## 2022-04-02 ENCOUNTER — HOSPITAL ENCOUNTER (OUTPATIENT)
Dept: LAB | Age: 57
Discharge: STILL ADMITTED OR ADMINISTRATIVE | End: 2022-04-02

## 2022-04-02 DIAGNOSIS — Z11.52 ENCOUNTER FOR SCREENING FOR COVID-19: ICD-10-CM

## 2022-04-02 PROCEDURE — U0005 INFEC AGEN DETEC AMPLI PROBE: HCPCS | Performed by: INTERNAL MEDICINE

## 2022-04-02 PROCEDURE — U0003 INFECTIOUS AGENT DETECTION BY NUCLEIC ACID (DNA OR RNA); SEVERE ACUTE RESPIRATORY SYNDROME CORONAVIRUS 2 (SARS-COV-2) (CORONAVIRUS DISEASE [COVID-19]), AMPLIFIED PROBE TECHNIQUE, MAKING USE OF HIGH THROUGHPUT TECHNOLOGIES AS DESCRIBED BY CMS-2020-01-R: HCPCS | Performed by: INTERNAL MEDICINE

## 2022-04-08 ENCOUNTER — HOSPITAL ENCOUNTER (OUTPATIENT)
Dept: LAB | Age: 57
Discharge: STILL ADMITTED OR ADMINISTRATIVE | End: 2022-04-08

## 2022-04-08 DIAGNOSIS — Z11.52 ENCOUNTER FOR SCREENING FOR COVID-19: ICD-10-CM

## 2022-04-08 LAB
SARS-COV-2 RNA RESP QL NAA+PROBE: NOT DETECTED
SERVICE CMNT-IMP: NORMAL
SERVICE CMNT-IMP: NORMAL

## 2022-04-08 PROCEDURE — U0003 INFECTIOUS AGENT DETECTION BY NUCLEIC ACID (DNA OR RNA); SEVERE ACUTE RESPIRATORY SYNDROME CORONAVIRUS 2 (SARS-COV-2) (CORONAVIRUS DISEASE [COVID-19]), AMPLIFIED PROBE TECHNIQUE, MAKING USE OF HIGH THROUGHPUT TECHNOLOGIES AS DESCRIBED BY CMS-2020-01-R: HCPCS | Performed by: INTERNAL MEDICINE

## 2022-04-08 PROCEDURE — U0005 INFEC AGEN DETEC AMPLI PROBE: HCPCS | Performed by: INTERNAL MEDICINE

## 2022-04-15 ENCOUNTER — HOSPITAL ENCOUNTER (OUTPATIENT)
Dept: LAB | Age: 57
Discharge: STILL ADMITTED OR ADMINISTRATIVE | End: 2022-04-15

## 2022-04-15 DIAGNOSIS — Z11.52 ENCOUNTER FOR SCREENING FOR COVID-19: ICD-10-CM

## 2022-04-15 LAB
SARS-COV-2 RNA RESP QL NAA+PROBE: NOT DETECTED
SERVICE CMNT-IMP: NORMAL
SERVICE CMNT-IMP: NORMAL

## 2022-04-15 PROCEDURE — U0005 INFEC AGEN DETEC AMPLI PROBE: HCPCS | Performed by: INTERNAL MEDICINE

## 2022-04-15 PROCEDURE — U0003 INFECTIOUS AGENT DETECTION BY NUCLEIC ACID (DNA OR RNA); SEVERE ACUTE RESPIRATORY SYNDROME CORONAVIRUS 2 (SARS-COV-2) (CORONAVIRUS DISEASE [COVID-19]), AMPLIFIED PROBE TECHNIQUE, MAKING USE OF HIGH THROUGHPUT TECHNOLOGIES AS DESCRIBED BY CMS-2020-01-R: HCPCS | Performed by: INTERNAL MEDICINE

## 2022-04-26 ENCOUNTER — HOSPITAL ENCOUNTER (OUTPATIENT)
Dept: LAB | Age: 57
Discharge: STILL ADMITTED OR ADMINISTRATIVE | End: 2022-04-26

## 2022-04-26 DIAGNOSIS — Z11.52 ENCOUNTER FOR SCREENING FOR COVID-19: ICD-10-CM

## 2022-04-26 LAB
SARS-COV-2 RNA RESP QL NAA+PROBE: NOT DETECTED
SERVICE CMNT-IMP: NORMAL
SERVICE CMNT-IMP: NORMAL

## 2022-04-26 PROCEDURE — U0005 INFEC AGEN DETEC AMPLI PROBE: HCPCS | Performed by: INTERNAL MEDICINE

## 2022-04-26 PROCEDURE — U0003 INFECTIOUS AGENT DETECTION BY NUCLEIC ACID (DNA OR RNA); SEVERE ACUTE RESPIRATORY SYNDROME CORONAVIRUS 2 (SARS-COV-2) (CORONAVIRUS DISEASE [COVID-19]), AMPLIFIED PROBE TECHNIQUE, MAKING USE OF HIGH THROUGHPUT TECHNOLOGIES AS DESCRIBED BY CMS-2020-01-R: HCPCS | Performed by: INTERNAL MEDICINE

## 2022-05-02 ENCOUNTER — HOSPITAL ENCOUNTER (OUTPATIENT)
Dept: LAB | Age: 57
Discharge: STILL ADMITTED OR ADMINISTRATIVE | End: 2022-05-02

## 2022-05-02 DIAGNOSIS — Z11.52 ENCOUNTER FOR SCREENING FOR COVID-19: ICD-10-CM

## 2022-05-02 LAB
SARS-COV-2 RNA RESP QL NAA+PROBE: NOT DETECTED
SERVICE CMNT-IMP: NORMAL
SERVICE CMNT-IMP: NORMAL

## 2022-05-02 PROCEDURE — U0005 INFEC AGEN DETEC AMPLI PROBE: HCPCS | Performed by: INTERNAL MEDICINE

## 2022-05-02 PROCEDURE — U0003 INFECTIOUS AGENT DETECTION BY NUCLEIC ACID (DNA OR RNA); SEVERE ACUTE RESPIRATORY SYNDROME CORONAVIRUS 2 (SARS-COV-2) (CORONAVIRUS DISEASE [COVID-19]), AMPLIFIED PROBE TECHNIQUE, MAKING USE OF HIGH THROUGHPUT TECHNOLOGIES AS DESCRIBED BY CMS-2020-01-R: HCPCS | Performed by: INTERNAL MEDICINE

## 2022-05-10 ENCOUNTER — HOSPITAL ENCOUNTER (OUTPATIENT)
Dept: LAB | Age: 57
Discharge: STILL ADMITTED OR ADMINISTRATIVE | End: 2022-05-10

## 2022-05-10 DIAGNOSIS — Z11.52 ENCOUNTER FOR SCREENING LABORATORY TESTING FOR COVID-19 VIRUS: ICD-10-CM

## 2022-05-10 LAB
SARS-COV-2 RNA RESP QL NAA+PROBE: NOT DETECTED
SERVICE CMNT-IMP: NORMAL
SERVICE CMNT-IMP: NORMAL

## 2022-05-10 PROCEDURE — U0005 INFEC AGEN DETEC AMPLI PROBE: HCPCS | Performed by: INTERNAL MEDICINE

## 2022-05-10 PROCEDURE — U0003 INFECTIOUS AGENT DETECTION BY NUCLEIC ACID (DNA OR RNA); SEVERE ACUTE RESPIRATORY SYNDROME CORONAVIRUS 2 (SARS-COV-2) (CORONAVIRUS DISEASE [COVID-19]), AMPLIFIED PROBE TECHNIQUE, MAKING USE OF HIGH THROUGHPUT TECHNOLOGIES AS DESCRIBED BY CMS-2020-01-R: HCPCS | Performed by: INTERNAL MEDICINE

## 2022-05-10 PROCEDURE — U0003 INFECTIOUS AGENT DETECTION BY NUCLEIC ACID (DNA OR RNA); SEVERE ACUTE RESPIRATORY SYNDROME CORONAVIRUS 2 (SARS-COV-2) (CORONAVIRUS DISEASE [COVID-19]), AMPLIFIED PROBE TECHNIQUE, MAKING USE OF HIGH THROUGHPUT TECHNOLOGIES AS DESCRIBED BY CMS-2020-01-R: HCPCS

## 2022-05-18 ENCOUNTER — HOSPITAL ENCOUNTER (OUTPATIENT)
Dept: LAB | Age: 57
Discharge: STILL ADMITTED OR ADMINISTRATIVE | End: 2022-05-18

## 2022-05-18 DIAGNOSIS — Z11.52 ENCOUNTER FOR SCREENING LABORATORY TESTING FOR COVID-19 VIRUS: ICD-10-CM

## 2022-05-18 LAB
SARS-COV-2 RNA RESP QL NAA+PROBE: NOT DETECTED
SERVICE CMNT-IMP: NORMAL
SERVICE CMNT-IMP: NORMAL

## 2022-05-18 PROCEDURE — U0005 INFEC AGEN DETEC AMPLI PROBE: HCPCS

## 2022-05-18 PROCEDURE — U0003 INFECTIOUS AGENT DETECTION BY NUCLEIC ACID (DNA OR RNA); SEVERE ACUTE RESPIRATORY SYNDROME CORONAVIRUS 2 (SARS-COV-2) (CORONAVIRUS DISEASE [COVID-19]), AMPLIFIED PROBE TECHNIQUE, MAKING USE OF HIGH THROUGHPUT TECHNOLOGIES AS DESCRIBED BY CMS-2020-01-R: HCPCS | Performed by: INTERNAL MEDICINE

## 2022-05-18 PROCEDURE — U0005 INFEC AGEN DETEC AMPLI PROBE: HCPCS | Performed by: INTERNAL MEDICINE

## 2022-05-23 ENCOUNTER — HOSPITAL ENCOUNTER (OUTPATIENT)
Dept: LAB | Age: 57
Discharge: STILL ADMITTED OR ADMINISTRATIVE | End: 2022-05-23

## 2022-05-23 DIAGNOSIS — Z11.52 ENCOUNTER FOR SCREENING LABORATORY TESTING FOR COVID-19 VIRUS: ICD-10-CM

## 2022-05-23 LAB
SARS-COV-2 RNA RESP QL NAA+PROBE: NOT DETECTED
SERVICE CMNT-IMP: NORMAL
SERVICE CMNT-IMP: NORMAL

## 2022-05-23 PROCEDURE — U0003 INFECTIOUS AGENT DETECTION BY NUCLEIC ACID (DNA OR RNA); SEVERE ACUTE RESPIRATORY SYNDROME CORONAVIRUS 2 (SARS-COV-2) (CORONAVIRUS DISEASE [COVID-19]), AMPLIFIED PROBE TECHNIQUE, MAKING USE OF HIGH THROUGHPUT TECHNOLOGIES AS DESCRIBED BY CMS-2020-01-R: HCPCS | Performed by: INTERNAL MEDICINE

## 2022-05-23 PROCEDURE — U0005 INFEC AGEN DETEC AMPLI PROBE: HCPCS | Performed by: INTERNAL MEDICINE

## 2022-05-23 PROCEDURE — U0003 INFECTIOUS AGENT DETECTION BY NUCLEIC ACID (DNA OR RNA); SEVERE ACUTE RESPIRATORY SYNDROME CORONAVIRUS 2 (SARS-COV-2) (CORONAVIRUS DISEASE [COVID-19]), AMPLIFIED PROBE TECHNIQUE, MAKING USE OF HIGH THROUGHPUT TECHNOLOGIES AS DESCRIBED BY CMS-2020-01-R: HCPCS

## 2022-06-01 ENCOUNTER — HOSPITAL ENCOUNTER (OUTPATIENT)
Dept: LAB | Age: 57
Discharge: STILL ADMITTED OR ADMINISTRATIVE | End: 2022-06-01

## 2022-06-01 DIAGNOSIS — Z11.52 ENCOUNTER FOR SCREENING LABORATORY TESTING FOR COVID-19 VIRUS: ICD-10-CM

## 2022-06-01 LAB
SARS-COV-2 RNA RESP QL NAA+PROBE: NOT DETECTED
SERVICE CMNT-IMP: NORMAL
SERVICE CMNT-IMP: NORMAL

## 2022-06-01 PROCEDURE — U0005 INFEC AGEN DETEC AMPLI PROBE: HCPCS | Performed by: INTERNAL MEDICINE

## 2022-06-01 PROCEDURE — U0003 INFECTIOUS AGENT DETECTION BY NUCLEIC ACID (DNA OR RNA); SEVERE ACUTE RESPIRATORY SYNDROME CORONAVIRUS 2 (SARS-COV-2) (CORONAVIRUS DISEASE [COVID-19]), AMPLIFIED PROBE TECHNIQUE, MAKING USE OF HIGH THROUGHPUT TECHNOLOGIES AS DESCRIBED BY CMS-2020-01-R: HCPCS | Performed by: INTERNAL MEDICINE

## 2022-06-01 PROCEDURE — U0005 INFEC AGEN DETEC AMPLI PROBE: HCPCS

## 2022-06-06 ENCOUNTER — HOSPITAL ENCOUNTER (OUTPATIENT)
Dept: LAB | Age: 57
Discharge: STILL ADMITTED OR ADMINISTRATIVE | End: 2022-06-06

## 2022-06-06 DIAGNOSIS — Z11.52 ENCOUNTER FOR SCREENING LABORATORY TESTING FOR COVID-19 VIRUS: ICD-10-CM

## 2022-06-06 LAB
SARS-COV-2 RNA RESP QL NAA+PROBE: NOT DETECTED
SERVICE CMNT-IMP: NORMAL
SERVICE CMNT-IMP: NORMAL

## 2022-06-06 PROCEDURE — U0005 INFEC AGEN DETEC AMPLI PROBE: HCPCS | Performed by: INTERNAL MEDICINE

## 2022-06-06 PROCEDURE — U0003 INFECTIOUS AGENT DETECTION BY NUCLEIC ACID (DNA OR RNA); SEVERE ACUTE RESPIRATORY SYNDROME CORONAVIRUS 2 (SARS-COV-2) (CORONAVIRUS DISEASE [COVID-19]), AMPLIFIED PROBE TECHNIQUE, MAKING USE OF HIGH THROUGHPUT TECHNOLOGIES AS DESCRIBED BY CMS-2020-01-R: HCPCS | Performed by: INTERNAL MEDICINE

## 2022-06-06 PROCEDURE — U0005 INFEC AGEN DETEC AMPLI PROBE: HCPCS

## 2022-06-20 ENCOUNTER — HOSPITAL ENCOUNTER (OUTPATIENT)
Dept: LAB | Age: 57
Discharge: STILL ADMITTED OR ADMINISTRATIVE | End: 2022-06-20

## 2022-06-20 DIAGNOSIS — Z11.52 ENCOUNTER FOR SCREENING LABORATORY TESTING FOR COVID-19 VIRUS: ICD-10-CM

## 2022-06-20 LAB
SARS-COV-2 RNA RESP QL NAA+PROBE: NOT DETECTED
SERVICE CMNT-IMP: NORMAL
SERVICE CMNT-IMP: NORMAL

## 2022-06-20 PROCEDURE — U0003 INFECTIOUS AGENT DETECTION BY NUCLEIC ACID (DNA OR RNA); SEVERE ACUTE RESPIRATORY SYNDROME CORONAVIRUS 2 (SARS-COV-2) (CORONAVIRUS DISEASE [COVID-19]), AMPLIFIED PROBE TECHNIQUE, MAKING USE OF HIGH THROUGHPUT TECHNOLOGIES AS DESCRIBED BY CMS-2020-01-R: HCPCS | Performed by: INTERNAL MEDICINE

## 2022-06-20 PROCEDURE — U0005 INFEC AGEN DETEC AMPLI PROBE: HCPCS | Performed by: INTERNAL MEDICINE

## 2022-06-27 ENCOUNTER — HOSPITAL ENCOUNTER (OUTPATIENT)
Dept: LAB | Age: 57
Discharge: STILL ADMITTED OR ADMINISTRATIVE | End: 2022-06-27

## 2022-06-27 DIAGNOSIS — Z11.52 ENCOUNTER FOR SCREENING LABORATORY TESTING FOR COVID-19 VIRUS: ICD-10-CM

## 2022-06-27 LAB
SARS-COV-2 RNA RESP QL NAA+PROBE: NOT DETECTED
SERVICE CMNT-IMP: NORMAL
SERVICE CMNT-IMP: NORMAL

## 2022-06-27 PROCEDURE — U0005 INFEC AGEN DETEC AMPLI PROBE: HCPCS | Performed by: INTERNAL MEDICINE

## 2022-06-27 PROCEDURE — U0003 INFECTIOUS AGENT DETECTION BY NUCLEIC ACID (DNA OR RNA); SEVERE ACUTE RESPIRATORY SYNDROME CORONAVIRUS 2 (SARS-COV-2) (CORONAVIRUS DISEASE [COVID-19]), AMPLIFIED PROBE TECHNIQUE, MAKING USE OF HIGH THROUGHPUT TECHNOLOGIES AS DESCRIBED BY CMS-2020-01-R: HCPCS | Performed by: INTERNAL MEDICINE

## 2022-07-05 ENCOUNTER — HOSPITAL ENCOUNTER (OUTPATIENT)
Dept: LAB | Age: 57
Discharge: STILL ADMITTED OR ADMINISTRATIVE | End: 2022-07-05

## 2022-07-05 DIAGNOSIS — Z11.52 ENCOUNTER FOR SCREENING LABORATORY TESTING FOR COVID-19 VIRUS: ICD-10-CM

## 2022-07-05 LAB
SARS-COV-2 RNA RESP QL NAA+PROBE: NOT DETECTED
SERVICE CMNT-IMP: NORMAL
SERVICE CMNT-IMP: NORMAL

## 2022-07-05 PROCEDURE — U0003 INFECTIOUS AGENT DETECTION BY NUCLEIC ACID (DNA OR RNA); SEVERE ACUTE RESPIRATORY SYNDROME CORONAVIRUS 2 (SARS-COV-2) (CORONAVIRUS DISEASE [COVID-19]), AMPLIFIED PROBE TECHNIQUE, MAKING USE OF HIGH THROUGHPUT TECHNOLOGIES AS DESCRIBED BY CMS-2020-01-R: HCPCS | Performed by: INTERNAL MEDICINE

## 2022-07-05 PROCEDURE — U0005 INFEC AGEN DETEC AMPLI PROBE: HCPCS | Performed by: INTERNAL MEDICINE

## 2022-07-11 ENCOUNTER — HOSPITAL ENCOUNTER (OUTPATIENT)
Dept: LAB | Age: 57
Discharge: STILL ADMITTED OR ADMINISTRATIVE | End: 2022-07-11

## 2022-07-11 DIAGNOSIS — Z11.52 ENCOUNTER FOR SCREENING LABORATORY TESTING FOR COVID-19 VIRUS: ICD-10-CM

## 2022-07-11 LAB
SARS-COV-2 RNA RESP QL NAA+PROBE: NOT DETECTED
SERVICE CMNT-IMP: NORMAL
SERVICE CMNT-IMP: NORMAL

## 2022-07-11 PROCEDURE — U0003 INFECTIOUS AGENT DETECTION BY NUCLEIC ACID (DNA OR RNA); SEVERE ACUTE RESPIRATORY SYNDROME CORONAVIRUS 2 (SARS-COV-2) (CORONAVIRUS DISEASE [COVID-19]), AMPLIFIED PROBE TECHNIQUE, MAKING USE OF HIGH THROUGHPUT TECHNOLOGIES AS DESCRIBED BY CMS-2020-01-R: HCPCS | Performed by: INTERNAL MEDICINE

## 2022-07-11 PROCEDURE — U0005 INFEC AGEN DETEC AMPLI PROBE: HCPCS | Performed by: INTERNAL MEDICINE

## 2022-07-18 ENCOUNTER — HOSPITAL ENCOUNTER (OUTPATIENT)
Dept: LAB | Age: 57
Discharge: STILL ADMITTED OR ADMINISTRATIVE | End: 2022-07-18

## 2022-07-18 DIAGNOSIS — Z11.52 ENCOUNTER FOR SCREENING LABORATORY TESTING FOR COVID-19 VIRUS: ICD-10-CM

## 2022-07-18 PROCEDURE — U0005 INFEC AGEN DETEC AMPLI PROBE: HCPCS | Performed by: INTERNAL MEDICINE

## 2022-07-18 PROCEDURE — U0003 INFECTIOUS AGENT DETECTION BY NUCLEIC ACID (DNA OR RNA); SEVERE ACUTE RESPIRATORY SYNDROME CORONAVIRUS 2 (SARS-COV-2) (CORONAVIRUS DISEASE [COVID-19]), AMPLIFIED PROBE TECHNIQUE, MAKING USE OF HIGH THROUGHPUT TECHNOLOGIES AS DESCRIBED BY CMS-2020-01-R: HCPCS | Performed by: INTERNAL MEDICINE

## 2022-07-19 LAB
SARS-COV-2 RNA RESP QL NAA+PROBE: NOT DETECTED
SERVICE CMNT-IMP: NORMAL
SERVICE CMNT-IMP: NORMAL

## 2022-07-26 ENCOUNTER — HOSPITAL ENCOUNTER (OUTPATIENT)
Dept: LAB | Age: 57
Discharge: STILL ADMITTED OR ADMINISTRATIVE | End: 2022-07-26

## 2022-07-26 DIAGNOSIS — Z11.52 ENCOUNTER FOR SCREENING LABORATORY TESTING FOR COVID-19 VIRUS: ICD-10-CM

## 2022-07-26 LAB
SARS-COV-2 RNA RESP QL NAA+PROBE: NOT DETECTED
SERVICE CMNT-IMP: NORMAL
SERVICE CMNT-IMP: NORMAL

## 2022-07-26 PROCEDURE — U0003 INFECTIOUS AGENT DETECTION BY NUCLEIC ACID (DNA OR RNA); SEVERE ACUTE RESPIRATORY SYNDROME CORONAVIRUS 2 (SARS-COV-2) (CORONAVIRUS DISEASE [COVID-19]), AMPLIFIED PROBE TECHNIQUE, MAKING USE OF HIGH THROUGHPUT TECHNOLOGIES AS DESCRIBED BY CMS-2020-01-R: HCPCS | Performed by: INTERNAL MEDICINE

## 2022-07-26 PROCEDURE — U0005 INFEC AGEN DETEC AMPLI PROBE: HCPCS | Performed by: INTERNAL MEDICINE

## 2022-08-01 ENCOUNTER — HOSPITAL ENCOUNTER (OUTPATIENT)
Dept: LAB | Age: 57
Discharge: STILL ADMITTED OR ADMINISTRATIVE | End: 2022-08-01

## 2022-08-01 DIAGNOSIS — Z11.52 ENCOUNTER FOR SCREENING LABORATORY TESTING FOR COVID-19 VIRUS: ICD-10-CM

## 2022-08-01 LAB
SARS-COV-2 RNA RESP QL NAA+PROBE: NOT DETECTED
SERVICE CMNT-IMP: NORMAL
SERVICE CMNT-IMP: NORMAL

## 2022-08-01 PROCEDURE — U0005 INFEC AGEN DETEC AMPLI PROBE: HCPCS | Performed by: INTERNAL MEDICINE

## 2022-08-01 PROCEDURE — U0003 INFECTIOUS AGENT DETECTION BY NUCLEIC ACID (DNA OR RNA); SEVERE ACUTE RESPIRATORY SYNDROME CORONAVIRUS 2 (SARS-COV-2) (CORONAVIRUS DISEASE [COVID-19]), AMPLIFIED PROBE TECHNIQUE, MAKING USE OF HIGH THROUGHPUT TECHNOLOGIES AS DESCRIBED BY CMS-2020-01-R: HCPCS | Performed by: INTERNAL MEDICINE

## 2022-08-08 ENCOUNTER — HOSPITAL ENCOUNTER (OUTPATIENT)
Dept: LAB | Age: 57
Discharge: STILL ADMITTED OR ADMINISTRATIVE | End: 2022-08-08

## 2022-08-08 DIAGNOSIS — Z11.52 ENCOUNTER FOR SCREENING LABORATORY TESTING FOR COVID-19 VIRUS: ICD-10-CM

## 2022-08-08 LAB
SARS-COV-2 RNA RESP QL NAA+PROBE: NOT DETECTED
SERVICE CMNT-IMP: NORMAL
SERVICE CMNT-IMP: NORMAL

## 2022-08-08 PROCEDURE — U0005 INFEC AGEN DETEC AMPLI PROBE: HCPCS | Performed by: INTERNAL MEDICINE

## 2022-08-08 PROCEDURE — U0003 INFECTIOUS AGENT DETECTION BY NUCLEIC ACID (DNA OR RNA); SEVERE ACUTE RESPIRATORY SYNDROME CORONAVIRUS 2 (SARS-COV-2) (CORONAVIRUS DISEASE [COVID-19]), AMPLIFIED PROBE TECHNIQUE, MAKING USE OF HIGH THROUGHPUT TECHNOLOGIES AS DESCRIBED BY CMS-2020-01-R: HCPCS | Performed by: INTERNAL MEDICINE

## 2022-08-16 ENCOUNTER — HOSPITAL ENCOUNTER (OUTPATIENT)
Dept: LAB | Age: 57
Discharge: STILL ADMITTED OR ADMINISTRATIVE | End: 2022-08-16

## 2022-08-16 DIAGNOSIS — Z11.52 ENCOUNTER FOR SCREENING LABORATORY TESTING FOR COVID-19 VIRUS: ICD-10-CM

## 2022-08-16 LAB
SARS-COV-2 RNA RESP QL NAA+PROBE: NOT DETECTED
SERVICE CMNT-IMP: NORMAL
SERVICE CMNT-IMP: NORMAL

## 2022-08-16 PROCEDURE — U0003 INFECTIOUS AGENT DETECTION BY NUCLEIC ACID (DNA OR RNA); SEVERE ACUTE RESPIRATORY SYNDROME CORONAVIRUS 2 (SARS-COV-2) (CORONAVIRUS DISEASE [COVID-19]), AMPLIFIED PROBE TECHNIQUE, MAKING USE OF HIGH THROUGHPUT TECHNOLOGIES AS DESCRIBED BY CMS-2020-01-R: HCPCS | Performed by: INTERNAL MEDICINE

## 2022-08-16 PROCEDURE — U0005 INFEC AGEN DETEC AMPLI PROBE: HCPCS | Performed by: INTERNAL MEDICINE

## 2022-08-17 ENCOUNTER — OFFICE VISIT (OUTPATIENT)
Dept: PHYSICAL MEDICINE AND REHAB | Facility: CLINIC | Age: 57
End: 2022-08-17
Payer: COMMERCIAL

## 2022-08-17 VITALS
WEIGHT: 190 LBS | HEIGHT: 62 IN | SYSTOLIC BLOOD PRESSURE: 128 MMHG | DIASTOLIC BLOOD PRESSURE: 82 MMHG | OXYGEN SATURATION: 98 % | BODY MASS INDEX: 34.96 KG/M2 | HEART RATE: 74 BPM

## 2022-08-17 DIAGNOSIS — G56.01 CARPAL TUNNEL SYNDROME OF RIGHT WRIST: Primary | ICD-10-CM

## 2022-08-17 PROCEDURE — 99214 OFFICE O/P EST MOD 30 MIN: CPT | Performed by: PHYSICAL MEDICINE & REHABILITATION

## 2022-08-17 PROCEDURE — 3074F SYST BP LT 130 MM HG: CPT | Performed by: PHYSICAL MEDICINE & REHABILITATION

## 2022-08-17 PROCEDURE — 3008F BODY MASS INDEX DOCD: CPT | Performed by: PHYSICAL MEDICINE & REHABILITATION

## 2022-08-17 PROCEDURE — 3079F DIAST BP 80-89 MM HG: CPT | Performed by: PHYSICAL MEDICINE & REHABILITATION

## 2022-08-17 NOTE — PATIENT INSTRUCTIONS
-Start night splints for the next 4-6 weeks  -Start OT and home exercises  -Voltaren gel 3-4x daily  -Follow up in 4 weeks  -If no better, will discuss repeat injection and EMG test

## 2022-08-24 ENCOUNTER — HOSPITAL ENCOUNTER (OUTPATIENT)
Dept: LAB | Age: 57
Discharge: STILL ADMITTED OR ADMINISTRATIVE | End: 2022-08-24

## 2022-08-24 DIAGNOSIS — Z11.52 ENCOUNTER FOR SCREENING LABORATORY TESTING FOR COVID-19 VIRUS: ICD-10-CM

## 2022-08-24 LAB
SARS-COV-2 RNA RESP QL NAA+PROBE: NOT DETECTED
SERVICE CMNT-IMP: NORMAL
SERVICE CMNT-IMP: NORMAL

## 2022-08-24 PROCEDURE — U0003 INFECTIOUS AGENT DETECTION BY NUCLEIC ACID (DNA OR RNA); SEVERE ACUTE RESPIRATORY SYNDROME CORONAVIRUS 2 (SARS-COV-2) (CORONAVIRUS DISEASE [COVID-19]), AMPLIFIED PROBE TECHNIQUE, MAKING USE OF HIGH THROUGHPUT TECHNOLOGIES AS DESCRIBED BY CMS-2020-01-R: HCPCS | Performed by: INTERNAL MEDICINE

## 2022-08-24 PROCEDURE — U0005 INFEC AGEN DETEC AMPLI PROBE: HCPCS | Performed by: INTERNAL MEDICINE

## 2022-08-29 ENCOUNTER — HOSPITAL ENCOUNTER (OUTPATIENT)
Dept: LAB | Age: 57
Discharge: STILL ADMITTED OR ADMINISTRATIVE | End: 2022-08-29

## 2022-08-29 DIAGNOSIS — Z11.52 ENCOUNTER FOR SCREENING LABORATORY TESTING FOR COVID-19 VIRUS: ICD-10-CM

## 2022-08-29 LAB
SARS-COV-2 RNA RESP QL NAA+PROBE: NOT DETECTED
SERVICE CMNT-IMP: NORMAL
SERVICE CMNT-IMP: NORMAL

## 2022-08-29 PROCEDURE — U0005 INFEC AGEN DETEC AMPLI PROBE: HCPCS | Performed by: INTERNAL MEDICINE

## 2022-08-29 PROCEDURE — U0003 INFECTIOUS AGENT DETECTION BY NUCLEIC ACID (DNA OR RNA); SEVERE ACUTE RESPIRATORY SYNDROME CORONAVIRUS 2 (SARS-COV-2) (CORONAVIRUS DISEASE [COVID-19]), AMPLIFIED PROBE TECHNIQUE, MAKING USE OF HIGH THROUGHPUT TECHNOLOGIES AS DESCRIBED BY CMS-2020-01-R: HCPCS | Performed by: INTERNAL MEDICINE

## 2022-09-02 ENCOUNTER — MED REC SCAN ONLY (OUTPATIENT)
Dept: PHYSICAL MEDICINE AND REHAB | Facility: CLINIC | Age: 57
End: 2022-09-02

## 2022-09-14 ENCOUNTER — OFFICE VISIT (OUTPATIENT)
Dept: PHYSICAL MEDICINE AND REHAB | Facility: CLINIC | Age: 57
End: 2022-09-14
Payer: COMMERCIAL

## 2022-09-14 VITALS
SYSTOLIC BLOOD PRESSURE: 126 MMHG | HEIGHT: 62 IN | OXYGEN SATURATION: 98 % | WEIGHT: 190 LBS | BODY MASS INDEX: 34.96 KG/M2 | DIASTOLIC BLOOD PRESSURE: 76 MMHG | HEART RATE: 91 BPM

## 2022-09-14 DIAGNOSIS — G56.03 BILATERAL CARPAL TUNNEL SYNDROME: Primary | ICD-10-CM

## 2022-09-14 PROCEDURE — 3078F DIAST BP <80 MM HG: CPT | Performed by: PHYSICAL MEDICINE & REHABILITATION

## 2022-09-14 PROCEDURE — 3074F SYST BP LT 130 MM HG: CPT | Performed by: PHYSICAL MEDICINE & REHABILITATION

## 2022-09-14 PROCEDURE — 99214 OFFICE O/P EST MOD 30 MIN: CPT | Performed by: PHYSICAL MEDICINE & REHABILITATION

## 2022-09-14 PROCEDURE — 3008F BODY MASS INDEX DOCD: CPT | Performed by: PHYSICAL MEDICINE & REHABILITATION

## 2022-09-14 NOTE — PATIENT INSTRUCTIONS
-EMG test of bilateral hands  -Continue current treatment  -Will discuss possible surgical intervention after

## 2022-11-30 ENCOUNTER — HOSPITAL ENCOUNTER (OUTPATIENT)
Dept: CT IMAGING | Age: 57
Discharge: HOME | End: 2022-11-30
Attending: OBSTETRICS & GYNECOLOGY

## 2022-11-30 DIAGNOSIS — Z12.31 ENCOUNTER FOR SCREENING MAMMOGRAM FOR MALIGNANT NEOPLASM OF BREAST: ICD-10-CM

## 2022-11-30 PROCEDURE — 77067 SCR MAMMO BI INCL CAD: CPT

## 2022-12-15 ENCOUNTER — PROCEDURE VISIT (OUTPATIENT)
Dept: PHYSICAL MEDICINE AND REHAB | Facility: CLINIC | Age: 57
End: 2022-12-15
Payer: COMMERCIAL

## 2022-12-15 ENCOUNTER — MED REC SCAN ONLY (OUTPATIENT)
Dept: PHYSICAL MEDICINE AND REHAB | Facility: CLINIC | Age: 57
End: 2022-12-15

## 2022-12-15 DIAGNOSIS — G56.03 CARPAL TUNNEL SYNDROME ON BOTH SIDES: Primary | ICD-10-CM

## 2022-12-15 RX ORDER — LIDOCAINE HYDROCHLORIDE 10 MG/ML
1 INJECTION, SOLUTION INFILTRATION; PERINEURAL ONCE
Status: COMPLETED | OUTPATIENT
Start: 2022-12-15 | End: 2022-12-15

## 2022-12-15 RX ORDER — TRIAMCINOLONE ACETONIDE 40 MG/ML
40 INJECTION, SUSPENSION INTRA-ARTICULAR; INTRAMUSCULAR ONCE
Status: COMPLETED | OUTPATIENT
Start: 2022-12-15 | End: 2022-12-15

## 2022-12-19 ENCOUNTER — TELEPHONE (OUTPATIENT)
Dept: PHYSICAL MEDICINE AND REHAB | Facility: CLINIC | Age: 57
End: 2022-12-19

## 2022-12-19 NOTE — TELEPHONE ENCOUNTER
Initiated authorization for Ultrasound guided bilateral carpal tunnel injection with corticosteroid CPT [id number] with [employer]  Status: Approved-authorization is not required per health plan

## 2022-12-23 ENCOUNTER — LAB REQUISITION (OUTPATIENT)
Dept: OBGYN | Age: 57
End: 2022-12-23

## 2022-12-23 DIAGNOSIS — Z12.4 ENCOUNTER FOR SCREENING FOR MALIGNANT NEOPLASM OF CERVIX: ICD-10-CM

## 2022-12-23 PROCEDURE — 88175 CYTOPATH C/V AUTO FLUID REDO: CPT | Performed by: CLINICAL MEDICAL LABORATORY

## 2022-12-23 PROCEDURE — 87624 HPV HI-RISK TYP POOLED RSLT: CPT | Performed by: CLINICAL MEDICAL LABORATORY

## 2022-12-30 ENCOUNTER — LAB SERVICES (OUTPATIENT)
Dept: LAB | Age: 57
End: 2022-12-30

## 2022-12-30 DIAGNOSIS — Z13.0 ENCOUNTER FOR SCREENING FOR DISEASES OF THE BLOOD AND BLOOD-FORMING ORGANS AND CERTAIN DISORDERS INVOLVING THE IMMUNE MECHANISM: ICD-10-CM

## 2022-12-30 DIAGNOSIS — Z13.220 ENCOUNTER FOR SCREENING FOR LIPOID DISORDERS: ICD-10-CM

## 2022-12-30 DIAGNOSIS — Z13.29 ENCOUNTER FOR SCREENING FOR OTHER SUSPECTED ENDOCRINE DISORDER: ICD-10-CM

## 2022-12-30 DIAGNOSIS — Z13.228 ENCOUNTER FOR SCREENING FOR OTHER METABOLIC DISORDERS: ICD-10-CM

## 2022-12-30 LAB
ALBUMIN SERPL-MCNC: 3.8 G/DL (ref 3.6–5.1)
ALBUMIN/GLOB SERPL: 1 {RATIO} (ref 1–2.4)
ALP SERPL-CCNC: 90 UNITS/L (ref 45–117)
ALT SERPL-CCNC: 29 UNITS/L
ANION GAP SERPL CALC-SCNC: 10 MMOL/L (ref 7–19)
AST SERPL-CCNC: 16 UNITS/L
BASOPHILS # BLD: 0 K/MCL (ref 0–0.3)
BASOPHILS NFR BLD: 0 %
BILIRUB SERPL-MCNC: 1.6 MG/DL (ref 0.2–1)
BUN SERPL-MCNC: 15 MG/DL (ref 6–20)
BUN/CREAT SERPL: 16 (ref 7–25)
CALCIUM SERPL-MCNC: 9.1 MG/DL (ref 8.4–10.2)
CASE RPRT: ABNORMAL
CHLORIDE SERPL-SCNC: 105 MMOL/L (ref 97–110)
CHOLEST SERPL-MCNC: 258 MG/DL
CHOLEST/HDLC SERPL: 3.9 {RATIO}
CLINICAL INFO: ABNORMAL
CO2 SERPL-SCNC: 29 MMOL/L (ref 21–32)
CREAT SERPL-MCNC: 0.92 MG/DL (ref 0.51–0.95)
CYTOLOGY CVX/VAG STUDY: ABNORMAL
CYTOLOGY CVX/VAG STUDY: ABNORMAL
DEPRECATED RDW RBC: 41.7 FL (ref 39–50)
EOSINOPHIL # BLD: 0.1 K/MCL (ref 0–0.5)
EOSINOPHIL NFR BLD: 2 %
ERYTHROCYTE [DISTWIDTH] IN BLOOD: 12.7 % (ref 11–15)
FASTING DURATION TIME PATIENT: 13 HOURS (ref 0–999)
FASTING DURATION TIME PATIENT: 13 HOURS (ref 0–999)
GEN CATEG CVX/VAG CYTO-IMP: ABNORMAL
GFR SERPLBLD BASED ON 1.73 SQ M-ARVRAT: 73 ML/MIN
GLOBULIN SER-MCNC: 3.7 G/DL (ref 2–4)
GLUCOSE SERPL-MCNC: 94 MG/DL (ref 70–99)
HCT VFR BLD CALC: 45.8 % (ref 36–46.5)
HDLC SERPL-MCNC: 66 MG/DL
HGB BLD-MCNC: 15.2 G/DL (ref 12–15.5)
HPV16+18+45 E6+E7MRNA CVX NAA+PROBE: NEGATIVE
IMM GRANULOCYTES # BLD AUTO: 0 K/MCL (ref 0–0.2)
IMM GRANULOCYTES # BLD: 0 %
LDLC SERPL CALC-MCNC: 177 MG/DL
LYMPHOCYTES # BLD: 2.3 K/MCL (ref 1–4)
LYMPHOCYTES NFR BLD: 32 %
Lab: NORMAL
MCH RBC QN AUTO: 30.1 PG (ref 26–34)
MCHC RBC AUTO-ENTMCNC: 33.2 G/DL (ref 32–36.5)
MCV RBC AUTO: 90.7 FL (ref 78–100)
MONOCYTES # BLD: 0.5 K/MCL (ref 0.3–0.9)
MONOCYTES NFR BLD: 7 %
NEUTROPHILS # BLD: 4.4 K/MCL (ref 1.8–7.7)
NEUTROPHILS NFR BLD: 59 %
NONHDLC SERPL-MCNC: 192 MG/DL
NRBC BLD MANUAL-RTO: 0 /100 WBC
PAP EDUCATIONAL NOTE: ABNORMAL
PLATELET # BLD AUTO: 283 K/MCL (ref 140–450)
POTASSIUM SERPL-SCNC: 4.5 MMOL/L (ref 3.4–5.1)
PROT SERPL-MCNC: 7.5 G/DL (ref 6.4–8.2)
RBC # BLD: 5.05 MIL/MCL (ref 4–5.2)
SODIUM SERPL-SCNC: 139 MMOL/L (ref 135–145)
SPECIMEN ADEQUACY: ABNORMAL
TRIGL SERPL-MCNC: 75 MG/DL
TSH SERPL-ACNC: 3.38 MCUNITS/ML (ref 0.35–5)
WBC # BLD: 7.4 K/MCL (ref 4.2–11)

## 2022-12-30 PROCEDURE — 80050 GENERAL HEALTH PANEL: CPT | Performed by: CLINICAL MEDICAL LABORATORY

## 2022-12-30 PROCEDURE — 36415 COLL VENOUS BLD VENIPUNCTURE: CPT | Performed by: CLINICAL MEDICAL LABORATORY

## 2022-12-30 PROCEDURE — 80061 LIPID PANEL: CPT | Performed by: CLINICAL MEDICAL LABORATORY

## 2023-01-23 ENCOUNTER — OFFICE VISIT (OUTPATIENT)
Dept: PHYSICAL MEDICINE AND REHAB | Facility: CLINIC | Age: 58
End: 2023-01-23
Payer: COMMERCIAL

## 2023-01-23 VITALS
WEIGHT: 204 LBS | BODY MASS INDEX: 37.54 KG/M2 | OXYGEN SATURATION: 96 % | SYSTOLIC BLOOD PRESSURE: 134 MMHG | DIASTOLIC BLOOD PRESSURE: 82 MMHG | HEIGHT: 62 IN | RESPIRATION RATE: 16 BRPM | HEART RATE: 104 BPM

## 2023-01-23 DIAGNOSIS — G56.03 CARPAL TUNNEL SYNDROME ON BOTH SIDES: Primary | ICD-10-CM

## 2023-01-23 PROCEDURE — 99214 OFFICE O/P EST MOD 30 MIN: CPT | Performed by: PHYSICAL MEDICINE & REHABILITATION

## 2023-01-23 PROCEDURE — 3008F BODY MASS INDEX DOCD: CPT | Performed by: PHYSICAL MEDICINE & REHABILITATION

## 2023-01-23 PROCEDURE — 3079F DIAST BP 80-89 MM HG: CPT | Performed by: PHYSICAL MEDICINE & REHABILITATION

## 2023-01-23 PROCEDURE — 3075F SYST BP GE 130 - 139MM HG: CPT | Performed by: PHYSICAL MEDICINE & REHABILITATION

## 2023-01-23 RX ORDER — CLOBETASOL PROPIONATE 0.5 MG/G
1 OINTMENT TOPICAL 2 TIMES DAILY
COMMUNITY
Start: 2022-12-23

## 2023-03-30 ENCOUNTER — OFFICE VISIT (OUTPATIENT)
Dept: SURGERY | Facility: CLINIC | Age: 58
End: 2023-03-30

## 2023-03-30 DIAGNOSIS — G56.03 BILATERAL CARPAL TUNNEL SYNDROME: Primary | ICD-10-CM

## 2023-03-30 PROCEDURE — 99244 OFF/OP CNSLTJ NEW/EST MOD 40: CPT | Performed by: PLASTIC SURGERY

## 2023-03-30 RX ORDER — HYDROCODONE BITARTRATE AND ACETAMINOPHEN 7.5; 325 MG/1; MG/1
1 TABLET ORAL
Qty: 10 TABLET | Refills: 0 | Status: SHIPPED | OUTPATIENT
Start: 2023-03-30

## 2023-03-30 NOTE — PROGRESS NOTES
Patient request for surgery signed by patient and witnessed and signed by RN. Prescription for Norco  and narcotic prescription electronically sent to pharmacy per Dr. Joyce Inman order and patient instructed to  prescription before surgery. Pre-Surgical Instruction Handout, Hand Elevation Handout  and Post-Operative Instruction Handout given to and reviewed w/patient. Pt given work status for work pre-op  All questions and concerns answered; pt verbalized an understanding of all pre-operative teaching. Patient instructed to call the office with any further questions and/or concerns. Patient will call at a later date to schedule surgery and post-operative appointments.

## 2023-04-11 DIAGNOSIS — G56.01 CARPAL TUNNEL SYNDROME ON RIGHT: Primary | ICD-10-CM

## 2023-04-17 ENCOUNTER — TELEPHONE (OUTPATIENT)
Dept: SURGERY | Facility: CLINIC | Age: 58
End: 2023-04-17

## 2023-04-17 NOTE — TELEPHONE ENCOUNTER
[employer] [place] faxed STD forms to [address] [place] [address] desk.   Scanned, sent orig to Forms

## 2023-04-18 NOTE — TELEPHONE ENCOUNTER
Disability forms received and logged for processing. My Chart Message sent for Sandra Zadlivar / Alessio Houston.

## 2023-04-24 NOTE — TELEPHONE ENCOUNTER
Dr. Pj Salinas,     Please sign off on form if you agree to: Disab 6/27/23-3 wks post op  (Please place your signature on the first page only)    -Within your inbasket, Highlight the patient and hit \"Chart\" button. -In Chart Review, w/in the Encounter tab - click 1 time on the Telephone call encounter for 4/17/23 Scroll down the telephone encounter.  -Click \"scan on\" blue Hyperlink under \"Media\" heading for Disab Dr Pj Salinas 4/24/23 w/in the telephone enc.  -Click on Acknowledge button at the bottom right corner and left-click onto image, signature stamp appears and drag signature to Provider signature line. Stamp will turn blue. Close window. Thank you,    Avi Esparza.

## 2023-06-21 ENCOUNTER — TELEPHONE (OUTPATIENT)
Dept: SURGERY | Facility: CLINIC | Age: 58
End: 2023-06-21

## 2023-06-21 NOTE — TELEPHONE ENCOUNTER
Spoke with patient. All changes to medications and allergies, per patient report, have been documented in the medical record. Patient  has not been ill, hospitalized, or had any surgical procedures since last seen in our office on 3/30/23    Dr. Su Coulter notified of medication deletions and additions and allergy update.

## 2023-06-27 ENCOUNTER — ANESTHESIA EVENT (OUTPATIENT)
Dept: SURGERY | Facility: HOSPITAL | Age: 58
End: 2023-06-27
Payer: COMMERCIAL

## 2023-06-27 ENCOUNTER — ANESTHESIA (OUTPATIENT)
Dept: SURGERY | Facility: HOSPITAL | Age: 58
End: 2023-06-27
Payer: COMMERCIAL

## 2023-06-27 ENCOUNTER — HOSPITAL ENCOUNTER (OUTPATIENT)
Facility: HOSPITAL | Age: 58
Setting detail: HOSPITAL OUTPATIENT SURGERY
Discharge: HOME | End: 2023-06-27
Attending: PLASTIC SURGERY | Admitting: PLASTIC SURGERY
Payer: COMMERCIAL

## 2023-06-27 ENCOUNTER — HOSPITAL DOCUMENTATION (OUTPATIENT)
Dept: SURGERY | Facility: CLINIC | Age: 58
End: 2023-06-27

## 2023-06-27 VITALS
HEART RATE: 64 BPM | DIASTOLIC BLOOD PRESSURE: 73 MMHG | SYSTOLIC BLOOD PRESSURE: 122 MMHG | HEIGHT: 62 IN | RESPIRATION RATE: 13 BRPM | TEMPERATURE: 98 F | WEIGHT: 202 LBS | OXYGEN SATURATION: 95 % | BODY MASS INDEX: 37.17 KG/M2

## 2023-06-27 DIAGNOSIS — G56.03 BILATERAL CARPAL TUNNEL SYNDROME: Primary | ICD-10-CM

## 2023-06-27 PROBLEM — Z04.9 OBSERVATION FOR SUSPECTED CONDITION: Status: ACTIVE | Noted: 2023-06-27

## 2023-06-27 PROCEDURE — 29848 WRIST ENDOSCOPY/SURGERY: CPT | Performed by: PLASTIC SURGERY

## 2023-06-27 PROCEDURE — 01N54ZZ RELEASE MEDIAN NERVE, PERCUTANEOUS ENDOSCOPIC APPROACH: ICD-10-PCS | Performed by: PLASTIC SURGERY

## 2023-06-27 RX ORDER — MORPHINE SULFATE 4 MG/ML
2 INJECTION, SOLUTION INTRAMUSCULAR; INTRAVENOUS EVERY 10 MIN PRN
Status: DISCONTINUED | OUTPATIENT
Start: 2023-06-27 | End: 2023-06-27

## 2023-06-27 RX ORDER — LIDOCAINE HYDROCHLORIDE 5 MG/ML
INJECTION, SOLUTION INFILTRATION; INTRAVENOUS AS NEEDED
Status: DISCONTINUED | OUTPATIENT
Start: 2023-06-27 | End: 2023-06-27 | Stop reason: SURG

## 2023-06-27 RX ORDER — HYDROMORPHONE HYDROCHLORIDE 1 MG/ML
0.2 INJECTION, SOLUTION INTRAMUSCULAR; INTRAVENOUS; SUBCUTANEOUS EVERY 5 MIN PRN
Status: DISCONTINUED | OUTPATIENT
Start: 2023-06-27 | End: 2023-06-27

## 2023-06-27 RX ORDER — MORPHINE SULFATE 10 MG/ML
6 INJECTION, SOLUTION INTRAMUSCULAR; INTRAVENOUS EVERY 10 MIN PRN
Status: DISCONTINUED | OUTPATIENT
Start: 2023-06-27 | End: 2023-06-27

## 2023-06-27 RX ORDER — HYDROCODONE BITARTRATE AND ACETAMINOPHEN 7.5; 325 MG/1; MG/1
1 TABLET ORAL EVERY 4 HOURS PRN
Status: DISCONTINUED | OUTPATIENT
Start: 2023-06-27 | End: 2023-06-27

## 2023-06-27 RX ORDER — ONDANSETRON 2 MG/ML
INJECTION INTRAMUSCULAR; INTRAVENOUS AS NEEDED
Status: DISCONTINUED | OUTPATIENT
Start: 2023-06-27 | End: 2023-06-27 | Stop reason: SURG

## 2023-06-27 RX ORDER — METOCLOPRAMIDE 10 MG/1
10 TABLET ORAL ONCE
Status: COMPLETED | OUTPATIENT
Start: 2023-06-27 | End: 2023-06-27

## 2023-06-27 RX ORDER — HYDROMORPHONE HYDROCHLORIDE 1 MG/ML
0.4 INJECTION, SOLUTION INTRAMUSCULAR; INTRAVENOUS; SUBCUTANEOUS EVERY 5 MIN PRN
Status: DISCONTINUED | OUTPATIENT
Start: 2023-06-27 | End: 2023-06-27

## 2023-06-27 RX ORDER — SODIUM CHLORIDE, SODIUM LACTATE, POTASSIUM CHLORIDE, CALCIUM CHLORIDE 600; 310; 30; 20 MG/100ML; MG/100ML; MG/100ML; MG/100ML
INJECTION, SOLUTION INTRAVENOUS CONTINUOUS
Status: DISCONTINUED | OUTPATIENT
Start: 2023-06-27 | End: 2023-06-27

## 2023-06-27 RX ORDER — ACETAMINOPHEN 500 MG
1000 TABLET ORAL ONCE
Status: COMPLETED | OUTPATIENT
Start: 2023-06-27 | End: 2023-06-27

## 2023-06-27 RX ORDER — MIDAZOLAM HYDROCHLORIDE 1 MG/ML
INJECTION INTRAMUSCULAR; INTRAVENOUS AS NEEDED
Status: DISCONTINUED | OUTPATIENT
Start: 2023-06-27 | End: 2023-06-27 | Stop reason: SURG

## 2023-06-27 RX ORDER — NALOXONE HYDROCHLORIDE 0.4 MG/ML
80 INJECTION, SOLUTION INTRAMUSCULAR; INTRAVENOUS; SUBCUTANEOUS AS NEEDED
Status: DISCONTINUED | OUTPATIENT
Start: 2023-06-27 | End: 2023-06-27

## 2023-06-27 RX ORDER — HYDROMORPHONE HYDROCHLORIDE 1 MG/ML
0.6 INJECTION, SOLUTION INTRAMUSCULAR; INTRAVENOUS; SUBCUTANEOUS EVERY 5 MIN PRN
Status: DISCONTINUED | OUTPATIENT
Start: 2023-06-27 | End: 2023-06-27

## 2023-06-27 RX ORDER — KETOROLAC TROMETHAMINE 30 MG/ML
INJECTION, SOLUTION INTRAMUSCULAR; INTRAVENOUS AS NEEDED
Status: DISCONTINUED | OUTPATIENT
Start: 2023-06-27 | End: 2023-06-27 | Stop reason: SURG

## 2023-06-27 RX ORDER — FAMOTIDINE 20 MG/1
20 TABLET, FILM COATED ORAL ONCE
Status: COMPLETED | OUTPATIENT
Start: 2023-06-27 | End: 2023-06-27

## 2023-06-27 RX ORDER — MORPHINE SULFATE 4 MG/ML
4 INJECTION, SOLUTION INTRAMUSCULAR; INTRAVENOUS EVERY 10 MIN PRN
Status: DISCONTINUED | OUTPATIENT
Start: 2023-06-27 | End: 2023-06-27

## 2023-06-27 RX ADMIN — KETOROLAC TROMETHAMINE 30 MG: 30 INJECTION, SOLUTION INTRAMUSCULAR; INTRAVENOUS at 08:05:00

## 2023-06-27 RX ADMIN — LIDOCAINE HYDROCHLORIDE 50 ML: 5 INJECTION, SOLUTION INFILTRATION; INTRAVENOUS at 07:31:00

## 2023-06-27 RX ADMIN — SODIUM CHLORIDE, SODIUM LACTATE, POTASSIUM CHLORIDE, CALCIUM CHLORIDE: 600; 310; 30; 20 INJECTION, SOLUTION INTRAVENOUS at 08:19:00

## 2023-06-27 RX ADMIN — MIDAZOLAM HYDROCHLORIDE 2 MG: 1 INJECTION INTRAMUSCULAR; INTRAVENOUS at 07:20:00

## 2023-06-27 RX ADMIN — ONDANSETRON 4 MG: 2 INJECTION INTRAMUSCULAR; INTRAVENOUS at 08:05:00

## 2023-06-28 ENCOUNTER — TELEPHONE (OUTPATIENT)
Dept: SURGERY | Facility: CLINIC | Age: 58
End: 2023-06-28

## 2023-06-28 ENCOUNTER — OFFICE VISIT (OUTPATIENT)
Dept: SURGERY | Facility: CLINIC | Age: 58
End: 2023-06-28

## 2023-06-28 DIAGNOSIS — M62.81 DISTAL MUSCLE WEAKNESS: Primary | ICD-10-CM

## 2023-06-28 DIAGNOSIS — M25.641 JOINT STIFFNESS OF HAND, RIGHT: ICD-10-CM

## 2023-07-11 ENCOUNTER — OFFICE VISIT (OUTPATIENT)
Dept: SURGERY | Facility: CLINIC | Age: 58
End: 2023-07-11

## 2023-07-11 DIAGNOSIS — M62.81 DISTAL MUSCLE WEAKNESS: Primary | ICD-10-CM

## 2023-07-11 DIAGNOSIS — M25.641 JOINT STIFFNESS OF HAND, RIGHT: ICD-10-CM

## 2023-07-11 PROCEDURE — 97110 THERAPEUTIC EXERCISES: CPT | Performed by: OCCUPATIONAL THERAPIST

## 2023-07-11 PROCEDURE — 97166 OT EVAL MOD COMPLEX 45 MIN: CPT | Performed by: OCCUPATIONAL THERAPIST

## 2023-07-11 NOTE — PROGRESS NOTES
OCCUPATIONAL THERAPY EVALUATION:   Austin Hernandez   [id number]       SUBJECTIVE:    HX of Injury: Right hand pain and numbness:  Chief Complaint:  Right hand weakness. Precautions:  2 # lifting restriction with the right hand. Premorbid Functional Status: Independent w/ Occ. duties, Independent w/ driving / sitting, Independent w/ ADL's  Current Level of Function: Independent with ADLs  Employment: Temporary leave  Hand Dominance: right  Living Situation: Family  Barriers to Learning: None  Patient Goals: Full use o the right hand. Imaging/Tests: EMG        OBJECTIVE DATA:   PAIN:   Rating (1/10): 1/10 at rest, 2/10 with activity  Location:     OBSERVATION:   Guarding movements    ORTHOTICS:  N/A    SCAR/INCISION: Flat, Non-adhered, and Flexible    SENSORY:  Improved right hand digit sensation, status post RECTR:      AROM/PROM:  (Degrees)  RIGHT HAND:    Thumb IF MF RF SF   MP Full right hand composite fist:       PIP        DIP        CENTENO                  STRENGTH: (lbs) Right Average Left Average   : NT NT   2 pt Pinch:     3 pt Pinch:     Lateral Pinch:         ASSESSMENT & PLAN OF CARE:    Treatment Provided: Patient was seen for an initial evaluation, hand washing: Sutures were removed per order: Reviewed cold cream scar massage technique  HEP:  AROM, Tendon glides, x 20 reps per set, x 5 sets daily. Reviewed hand elevation importance. Written handout was provided to reinforce today's treatment and educational session. Rehabilitation Potential: Good    CLINICAL ASSESSMENT:    Patient/Caregiver Education Provided: Yes    Treatment Plan:  Therapeutic Exercise  Therapeutic Activities  Scar Management  Patient/Family Education    GOALS:  Short term goals to be reached in x 1 week:    1) Independent with HEP. .      Long term goals to be reached in x 3 weeks:    1) Full functional use of the involved extremity for self-care, leisure and work related tasks:.         Patient will be seen 1 x /week for 3 weeks or a total of 3 visits. Pt. was advised regarding the findings of this evaluation and agrees to the plan of care. Miles Hicks I have reviewed the treatment plan and concur.    Doc Casey MD

## 2023-07-20 ENCOUNTER — OFFICE VISIT (OUTPATIENT)
Dept: SURGERY | Facility: CLINIC | Age: 58
End: 2023-07-20

## 2023-07-20 ENCOUNTER — APPOINTMENT (OUTPATIENT)
Dept: SURGERY | Facility: CLINIC | Age: 58
End: 2023-07-20

## 2023-07-20 DIAGNOSIS — G56.03 BILATERAL CARPAL TUNNEL SYNDROME: Primary | ICD-10-CM

## 2023-07-20 DIAGNOSIS — M25.641 JOINT STIFFNESS OF HAND, RIGHT: ICD-10-CM

## 2023-07-20 DIAGNOSIS — M62.81 DISTAL MUSCLE WEAKNESS: Primary | ICD-10-CM

## 2023-07-20 PROCEDURE — 97110 THERAPEUTIC EXERCISES: CPT | Performed by: OCCUPATIONAL THERAPIST

## 2023-07-20 PROCEDURE — 99024 POSTOP FOLLOW-UP VISIT: CPT | Performed by: PLASTIC SURGERY

## 2023-07-20 NOTE — PROGRESS NOTES
Surgery 1: RECTR  - Date: 06/27/23  - Days Since: 23    Injury 1: R wrist - TFCC sprain  - Date: 07/17/21  - Days Since: 733    23 days postop  No complaints. No pain. Numbness is gone  She is thrilled with her result  Still weak    Full range of motion  Strength 20 versus 60      She is still weak and we will continue her in therapy for strengthening and endurance    Back to work regular duty 3 weeks, 8/14    Discharged. To call if any problems or concerns.

## 2023-07-31 ENCOUNTER — APPOINTMENT (OUTPATIENT)
Dept: SURGERY | Facility: CLINIC | Age: 58
End: 2023-07-31

## 2023-07-31 DIAGNOSIS — M62.81 DISTAL MUSCLE WEAKNESS: Primary | ICD-10-CM

## 2023-07-31 DIAGNOSIS — M25.641 JOINT STIFFNESS OF HAND, RIGHT: ICD-10-CM

## 2023-07-31 PROCEDURE — 97022 WHIRLPOOL THERAPY: CPT | Performed by: OCCUPATIONAL THERAPIST

## 2023-07-31 PROCEDURE — 97035 APP MDLTY 1+ULTRASOUND EA 15: CPT | Performed by: OCCUPATIONAL THERAPIST

## 2023-08-07 ENCOUNTER — APPOINTMENT (OUTPATIENT)
Dept: SURGERY | Facility: CLINIC | Age: 58
End: 2023-08-07

## 2023-08-07 DIAGNOSIS — M62.81 DISTAL MUSCLE WEAKNESS: Primary | ICD-10-CM

## 2023-08-07 DIAGNOSIS — M25.641 JOINT STIFFNESS OF HAND, RIGHT: ICD-10-CM

## 2023-08-07 PROCEDURE — 97035 APP MDLTY 1+ULTRASOUND EA 15: CPT | Performed by: OCCUPATIONAL THERAPIST

## 2023-08-10 ENCOUNTER — OFFICE VISIT (OUTPATIENT)
Dept: SURGERY | Facility: CLINIC | Age: 58
End: 2023-08-10

## 2023-08-10 DIAGNOSIS — G56.03 BILATERAL CARPAL TUNNEL SYNDROME: Primary | ICD-10-CM

## 2023-08-10 DIAGNOSIS — M25.641 JOINT STIFFNESS OF HAND, RIGHT: ICD-10-CM

## 2023-08-10 DIAGNOSIS — M62.81 DISTAL MUSCLE WEAKNESS: Primary | ICD-10-CM

## 2023-08-10 PROCEDURE — 97035 APP MDLTY 1+ULTRASOUND EA 15: CPT | Performed by: OCCUPATIONAL THERAPIST

## 2023-08-10 RX ORDER — METHYLPREDNISOLONE 4 MG/1
TABLET ORAL
Qty: 1 EACH | Refills: 0 | Status: SHIPPED | OUTPATIENT
Start: 2023-08-10

## 2023-08-10 NOTE — PROGRESS NOTES
Subjective: I still have discomfort in the palm of my hand. Objective:     Current level of performance:  ADL: Independent  Work: On leave  Leisure: N/A    Measurements/Tests:  ROM:         Full right hand composite fist:         Treatment Provided this day: Ultrasound to the right hand volar surface scar: 8 min. Continuous 3.3 mhz w/cm squared. To reduce edema, increase circulation, soften scar tissue, for increased range of motion and reduction of pain. HEP. Treatment Time: 20 minutes      Summary/Analysis of Treatment session: Patient is not ready to return to full duty work status. Plan:  To return to full work duty 08/28/2024      Follow up in:  08/16/2023          Coretta Stein  OTR/L

## 2023-08-10 NOTE — PROGRESS NOTES
44 days post RECTR.     Her numbness is gone and she is still with her result    Persistent mid palm scar pain, occasionally very sharp when she strikes that area    Normal function    Full range of motion  Scars healing well    Palmar scar somewhat firm but not tender on palpation    Continue OT including ultrasound  Medrol Dosepak

## 2023-08-14 ENCOUNTER — APPOINTMENT (OUTPATIENT)
Dept: SURGERY | Facility: CLINIC | Age: 58
End: 2023-08-14

## 2023-08-14 DIAGNOSIS — M25.641 JOINT STIFFNESS OF HAND, RIGHT: ICD-10-CM

## 2023-08-14 DIAGNOSIS — M62.81 DISTAL MUSCLE WEAKNESS: Primary | ICD-10-CM

## 2023-08-14 PROCEDURE — 97035 APP MDLTY 1+ULTRASOUND EA 15: CPT | Performed by: OCCUPATIONAL THERAPIST

## 2023-08-21 ENCOUNTER — OFFICE VISIT (OUTPATIENT)
Dept: SURGERY | Facility: CLINIC | Age: 58
End: 2023-08-21

## 2023-08-21 DIAGNOSIS — M25.641 JOINT STIFFNESS OF HAND, RIGHT: ICD-10-CM

## 2023-08-21 DIAGNOSIS — M62.81 DISTAL MUSCLE WEAKNESS: Primary | ICD-10-CM

## 2023-08-21 PROCEDURE — 97035 APP MDLTY 1+ULTRASOUND EA 15: CPT | Performed by: OCCUPATIONAL THERAPIST

## 2023-08-21 NOTE — PROGRESS NOTES
Subjective: I am good to return to work:      Objective:     Current level of performance:  ADL: Independent  Work: Returning to full duty 08/28/2023  Leisure: Family    Measurements/Tests:  ROM:         Full right hand composite fist.         Treatment Provided this day: Ultrasound to the right hand palmar surface scar sites:8 min. Continuous 3.3 mhz w/cm squared. To reduce edema, increase circulation, soften scar tissue, for increased range of motion and reduction of pain. HEP:    Treatment Time: 20 Minutes      Summary/Analysis of Treatment session: No further OT needs at this time. Plan: Discontinue OT. Follow up in: To call with questions and or concerns:          Mango Bull  OTR/L      I have reviewed the treatment plan and concur.    Dashawn Patel MD

## 2023-10-19 DIAGNOSIS — Z12.31 VISIT FOR SCREENING MAMMOGRAM: Primary | ICD-10-CM

## 2023-12-04 ENCOUNTER — HOSPITAL ENCOUNTER (OUTPATIENT)
Dept: CT IMAGING | Age: 58
Discharge: HOME | End: 2023-12-04
Attending: OBSTETRICS & GYNECOLOGY

## 2023-12-04 DIAGNOSIS — Z12.31 VISIT FOR SCREENING MAMMOGRAM: ICD-10-CM

## 2023-12-04 PROCEDURE — 77067 SCR MAMMO BI INCL CAD: CPT

## 2023-12-05 ENCOUNTER — TELEPHONE (OUTPATIENT)
Dept: CT IMAGING | Age: 58
End: 2023-12-05

## 2024-01-08 ENCOUNTER — HOSPITAL ENCOUNTER (OUTPATIENT)
Dept: CT IMAGING | Age: 59
Discharge: HOME | End: 2024-01-08
Attending: OBSTETRICS & GYNECOLOGY

## 2024-01-08 DIAGNOSIS — R92.8 ABNORMAL MAMMOGRAM: ICD-10-CM

## 2024-01-08 PROCEDURE — 77065 DX MAMMO INCL CAD UNI: CPT

## 2024-01-08 PROCEDURE — 76642 ULTRASOUND BREAST LIMITED: CPT

## 2024-01-19 ENCOUNTER — LAB REQUISITION (OUTPATIENT)
Dept: OBGYN | Age: 59
End: 2024-01-19

## 2024-01-19 DIAGNOSIS — Z12.4 ENCOUNTER FOR SCREENING FOR MALIGNANT NEOPLASM OF CERVIX: ICD-10-CM

## 2024-01-19 PROCEDURE — 88175 CYTOPATH C/V AUTO FLUID REDO: CPT | Performed by: CLINICAL MEDICAL LABORATORY

## 2024-01-19 PROCEDURE — 87624 HPV HI-RISK TYP POOLED RSLT: CPT | Performed by: CLINICAL MEDICAL LABORATORY

## 2024-01-26 LAB
CASE RPRT: ABNORMAL
CLINICAL INFO: ABNORMAL
CYTOLOGY CVX/VAG STUDY: ABNORMAL
CYTOLOGY CVX/VAG STUDY: ABNORMAL
GEN CATEG CVX/VAG CYTO-IMP: ABNORMAL
HPV16+18+45 E6+E7MRNA CVX NAA+PROBE: POSITIVE
Lab: ABNORMAL
PAP EDUCATIONAL NOTE: ABNORMAL
SPECIMEN ADEQUACY: ABNORMAL

## 2024-02-20 ENCOUNTER — LAB SERVICES (OUTPATIENT)
Dept: LAB | Age: 59
End: 2024-02-20

## 2024-02-20 ENCOUNTER — LAB REQUISITION (OUTPATIENT)
Dept: OBGYN | Age: 59
End: 2024-02-20

## 2024-02-20 DIAGNOSIS — Z13.228 ENCOUNTER FOR SCREENING FOR OTHER METABOLIC DISORDERS: ICD-10-CM

## 2024-02-20 DIAGNOSIS — Z13.220 ENCOUNTER FOR SCREENING FOR LIPOID DISORDERS: ICD-10-CM

## 2024-02-20 DIAGNOSIS — Z13.29 ENCOUNTER FOR SCREENING FOR OTHER SUSPECTED ENDOCRINE DISORDER: ICD-10-CM

## 2024-02-20 DIAGNOSIS — R87.612 LOW GRADE SQUAMOUS INTRAEPITHELIAL LESION ON CYTOLOGIC SMEAR OF CERVIX (LGSIL): ICD-10-CM

## 2024-02-20 DIAGNOSIS — N89.8 VAGINAL ITCHING: ICD-10-CM

## 2024-02-20 DIAGNOSIS — Z13.0 ENCOUNTER FOR SCREENING FOR DISEASES OF THE BLOOD AND BLOOD-FORMING ORGANS AND CERTAIN DISORDERS INVOLVING THE IMMUNE MECHANISM: ICD-10-CM

## 2024-02-20 LAB
ALBUMIN SERPL-MCNC: 3.9 G/DL (ref 3.6–5.1)
ALBUMIN/GLOB SERPL: 1.1 {RATIO} (ref 1–2.4)
ALP SERPL-CCNC: 85 UNITS/L (ref 45–117)
ALT SERPL-CCNC: 36 UNITS/L
ANION GAP SERPL CALC-SCNC: 6 MMOL/L (ref 7–19)
AST SERPL-CCNC: 19 UNITS/L
BASOPHILS # BLD: 0 K/MCL (ref 0–0.3)
BASOPHILS NFR BLD: 0 %
BILIRUB SERPL-MCNC: 1.7 MG/DL (ref 0.2–1)
BUN SERPL-MCNC: 11 MG/DL (ref 6–20)
BUN/CREAT SERPL: 13 (ref 7–25)
CALCIUM SERPL-MCNC: 9.5 MG/DL (ref 8.4–10.2)
CHLORIDE SERPL-SCNC: 106 MMOL/L (ref 97–110)
CHOLEST SERPL-MCNC: 274 MG/DL
CHOLEST/HDLC SERPL: 5.2 {RATIO}
CO2 SERPL-SCNC: 29 MMOL/L (ref 21–32)
CREAT SERPL-MCNC: 0.83 MG/DL (ref 0.51–0.95)
DEPRECATED RDW RBC: 40.3 FL (ref 39–50)
EGFRCR SERPLBLD CKD-EPI 2021: 82 ML/MIN/{1.73_M2}
EOSINOPHIL # BLD: 0.1 K/MCL (ref 0–0.5)
EOSINOPHIL NFR BLD: 1 %
ERYTHROCYTE [DISTWIDTH] IN BLOOD: 12.3 % (ref 11–15)
FASTING DURATION TIME PATIENT: 12 HOURS (ref 0–999)
GLOBULIN SER-MCNC: 3.4 G/DL (ref 2–4)
GLUCOSE SERPL-MCNC: 96 MG/DL (ref 70–99)
HCT VFR BLD CALC: 44.2 % (ref 36–46.5)
HDLC SERPL-MCNC: 53 MG/DL
HGB BLD-MCNC: 14.7 G/DL (ref 12–15.5)
IMM GRANULOCYTES # BLD AUTO: 0 K/MCL (ref 0–0.2)
IMM GRANULOCYTES # BLD: 0 %
LDLC SERPL CALC-MCNC: 193 MG/DL
LYMPHOCYTES # BLD: 1.9 K/MCL (ref 1–4)
LYMPHOCYTES NFR BLD: 31 %
MCH RBC QN AUTO: 30 PG (ref 26–34)
MCHC RBC AUTO-ENTMCNC: 33.3 G/DL (ref 32–36.5)
MCV RBC AUTO: 90.2 FL (ref 78–100)
MONOCYTES # BLD: 0.4 K/MCL (ref 0.3–0.9)
MONOCYTES NFR BLD: 7 %
NEUTROPHILS # BLD: 3.8 K/MCL (ref 1.8–7.7)
NEUTROPHILS NFR BLD: 61 %
NONHDLC SERPL-MCNC: 221 MG/DL
NRBC BLD MANUAL-RTO: 0 /100 WBC
PLATELET # BLD AUTO: 251 K/MCL (ref 140–450)
POTASSIUM SERPL-SCNC: 4.4 MMOL/L (ref 3.4–5.1)
PROT SERPL-MCNC: 7.3 G/DL (ref 6.4–8.2)
RBC # BLD: 4.9 MIL/MCL (ref 4–5.2)
SODIUM SERPL-SCNC: 137 MMOL/L (ref 135–145)
TRIGL SERPL-MCNC: 142 MG/DL
TSH SERPL-ACNC: 2.2 MCUNITS/ML (ref 0.35–5)
WBC # BLD: 6.2 K/MCL (ref 4.2–11)

## 2024-02-20 PROCEDURE — 88305 TISSUE EXAM BY PATHOLOGIST: CPT | Performed by: CLINICAL MEDICAL LABORATORY

## 2024-02-20 PROCEDURE — 80061 LIPID PANEL: CPT | Performed by: CLINICAL MEDICAL LABORATORY

## 2024-02-20 PROCEDURE — 87077 CULTURE AEROBIC IDENTIFY: CPT | Performed by: CLINICAL MEDICAL LABORATORY

## 2024-02-20 PROCEDURE — 87070 CULTURE OTHR SPECIMN AEROBIC: CPT | Performed by: CLINICAL MEDICAL LABORATORY

## 2024-02-20 PROCEDURE — 80050 GENERAL HEALTH PANEL: CPT | Performed by: CLINICAL MEDICAL LABORATORY

## 2024-02-20 PROCEDURE — 36415 COLL VENOUS BLD VENIPUNCTURE: CPT | Performed by: CLINICAL MEDICAL LABORATORY

## 2024-02-20 PROCEDURE — 87205 SMEAR GRAM STAIN: CPT | Performed by: CLINICAL MEDICAL LABORATORY

## 2024-02-24 LAB
BACTERIA GENITAL AEROBE CULT: ABNORMAL
GRAM STN SPEC: ABNORMAL

## 2024-03-19 ENCOUNTER — LAB REQUISITION (OUTPATIENT)
Dept: OBGYN | Age: 59
End: 2024-03-19

## 2024-03-19 DIAGNOSIS — N87.0 DYSPLASIA OF CERVIX, LOW GRADE (CIN 1): ICD-10-CM

## 2024-03-19 PROCEDURE — 88307 TISSUE EXAM BY PATHOLOGIST: CPT | Performed by: CLINICAL MEDICAL LABORATORY

## 2024-04-17 ENCOUNTER — OFFICE VISIT (OUTPATIENT)
Dept: PHYSICAL MEDICINE AND REHAB | Facility: CLINIC | Age: 59
End: 2024-04-17
Payer: COMMERCIAL

## 2024-04-17 ENCOUNTER — TELEPHONE (OUTPATIENT)
Dept: PHYSICAL MEDICINE AND REHAB | Facility: CLINIC | Age: 59
End: 2024-04-17

## 2024-04-17 VITALS — WEIGHT: 195 LBS | RESPIRATION RATE: 18 BRPM | BODY MASS INDEX: 35.88 KG/M2 | HEIGHT: 62 IN

## 2024-04-17 DIAGNOSIS — G56.02 CARPAL TUNNEL SYNDROME OF LEFT WRIST: Primary | ICD-10-CM

## 2024-04-17 PROCEDURE — 99214 OFFICE O/P EST MOD 30 MIN: CPT | Performed by: PHYSICAL MEDICINE & REHABILITATION

## 2024-04-17 PROCEDURE — 76942 ECHO GUIDE FOR BIOPSY: CPT | Performed by: PHYSICAL MEDICINE & REHABILITATION

## 2024-04-17 PROCEDURE — 20526 THER INJECTION CARP TUNNEL: CPT | Performed by: PHYSICAL MEDICINE & REHABILITATION

## 2024-04-17 PROCEDURE — 3008F BODY MASS INDEX DOCD: CPT | Performed by: PHYSICAL MEDICINE & REHABILITATION

## 2024-04-17 RX ORDER — LIDOCAINE HYDROCHLORIDE 10 MG/ML
1 INJECTION, SOLUTION INFILTRATION; PERINEURAL ONCE
Status: COMPLETED | OUTPATIENT
Start: 2024-04-17 | End: 2024-04-17

## 2024-04-17 RX ORDER — TRIAMCINOLONE ACETONIDE 40 MG/ML
40 INJECTION, SUSPENSION INTRA-ARTICULAR; INTRAMUSCULAR ONCE
Status: COMPLETED | OUTPATIENT
Start: 2024-04-17 | End: 2024-04-17

## 2024-04-17 NOTE — TELEPHONE ENCOUNTER
Initiated authorization for Ultrasound guided left carpal tunnel injection with corticosteroid  CPT/[facility] [address], , [address]31 with [employer]  Status: Approved-authorization is not required per health plan however may be subject to review once claim is submitted      Inj done in office

## 2024-04-17 NOTE — PROCEDURES
Procedure:  Ultrasound guided LEFT carpal tunnel injection  The risks, benefits and anticipated outcomes of the procedure, the risks and benefits of the alternatives to the procedure, and the roles and tasks of the personnel to be involved, were discussed with the patient, and the patient consents to the procedure and agrees to proceed.  UNIVERSAL PROTOCOL / SAFETY CHECKLIST  Sign in Communication: Completed  Time Out:  Team Confirms the Correct Patient, Correct Procedure, Correct Site and Site Marking, Correct Position (if applicable), Prep and Dry Time (if applicable).    Affirmation of Time Out: YES  Sign Out Discussion: Completed if applicable    The procedure was carried out under sterile prep  with sterile gel.  The target site was anesthetized with a topical ethyl chloride spray.  Then, A  25ga 1.5in needle was introduced and advanced with ultrasound guidance from radial to ulnar direction using an in plane approach with the transducer in transverse orientation.  Following negative aspiration, a mixture of 1cc of 1% lidocaine and 1 cc of Kenalog (40mg/ml) was injected.  Permanent pictures were taken and stored in the PACS system.      Ultrasound interpretation was performed prior to the procedure to identify the target and any adjacent neurovascular structures, and during real-time needle guidance confirming placement.  Post-intervention interpretation was also performed confirming appropriate injectate flow and hemostasis. The patient tolerated the procedure without complication and was instructed in post-procedure precautions.  Please see patient instructions.    Yvette Olivera DO, FAAPMR & CAQSM  Physical Medicine and Rehabilitation/Sports Medicine  [facility]

## 2024-04-17 NOTE — PATIENT INSTRUCTIONS
Steroid Injection Information  What to expect: The injection contains Lidocaine (which numbs the area) and Kenalog (a steroid which decreases inflammation).  You may have pain relief within hours of the injection due to the Lidocaine.  The Kenalog can take a couple days, up to a couple weeks, to reach the full effect.  It is also possible to have a slight increase in symptoms over the first few days, but that should resolve fairly quickly.    How long will the injection last?: The length of response to an injection is variable.  Literally a couple weeks to a couple years.  The injection will decrease the inflammation and the pain will return if/when the inflammation returns.    Activity Recommendations: For the first 24 hours after injection, keep the area clean and dry.  It is ok to shower but don't soak in a tub during that time.  No vigorous activity such as running or heavy lifting for the first week but other than that you can gradually resume your normal activities immediately.  If you have a significant decrease in pain, be careful not to do too much too soon.  Again, the key is GRADUAL resumption of activites.    Things to look out for: Common injection side effects include soreness at the injection site, bruising, flushing of the face or skin, and a temporary increase in your blood sugars and/or blood pressure.  Infection is very rare but please notify my office ([facility] [phone]) if you develop any fevers, drainage from the injection site, or severe increase in pain.  If it is the weekend, go to an Emergency Room.       4 weeks video  Continue bracing at nighttime and home exercises  If pain is no better or does not last long enough will recommend reevaluation with surgery

## 2024-04-17 NOTE — PROGRESS NOTES
[facility]  OFFICE FOLLOW UP EVALUATION      HISTORY OF PRESENT ILLNESS:     Chief Complaint   Patient presents with    Follow - Up     Returning patient here for follow up. LOV 1/23/23. Reports flare of pain to left hand. N/T to left thumb, 1st and 2nd fingers. Wearing a  with no relief. Admits to dropping things. Pain with gripping. CPL 3/10. No recent imaging or PT.       Patient is following up for numbness tingling sensation in the left hand.  She states the carpal tunnel injection left hand was provided good improvement however recently the pain is returned.  She has pain at nighttime with difficulty with  strength and is dropping objects.  She had carpal tunnel release in the right hand about a year ago which has helped with her symptoms.  She denies any changes in strength or bowel bladder.  She rates pain to be 3 out of 10 currently but at times can be much more severe.  She would like to have repeat corticosteroid injection.  She has had EMG testing with me in the past.    PHYSICAL EXAM:   Resp 18   Ht 62\"   Wt 195 lb (88.5 kg)   BMI 35.67 kg/m²     WRIST:  Inspection: no erythema, swelling, or obvious deformity  Palpation: no ttp in carpal bones or areas of pain  ROM: intact to all planes of motion  Strength: 5/5  Sensation: Intact to light touch in all dermatomes of the lower extremities  Tinel's test: positive for reproducible symptoms       IMAGING:     None    All imaging results were reviewed and discussed with patient.      ASSESSMENT/PLAN:     1. Carpal tunnel syndrome of left wrist        Philomena Gutiérrez is a 58 year old female following up for gross pain secondary carpal tunnel syndrome.  I recommended and performed ultrasound-guided carpal tunnel injection with corticosteroid.  Advised her if she continues to experience persistent symptoms she may need to reevaluate with orthopedic surgery.  She may need repeat EMG testing in  the future as well.  She will continue home exercises and night splints as tolerated.  She will follow-up with me in 4 weeks.      The patient verbalized understanding with the plan and was in agreement. All questions/concerns were addressed and there were no barriers to learning.  Please note Dragon dictation software was used to dictate this note and may result in inadvertent typos.    Yvette Olivera DO, FAAPMR & CAQSM  Physical Medicine and Rehabilitation  Sports and Spine Medicine    PAST MEDICAL HISTORY:     Past Medical History:    Carpal tunnel syndrome on right    Obesity         PAST SURGICAL HISTORY:     Past Surgical History:   Procedure Laterality Date    Other surgical history      s/p left knee arthroscopy    Other surgical history      s/p laparoscopy    Wrist arthroscop,release xvers lig Right 06/27/2023    Right endoscopic carpal tunnel release         CURRENT MEDICATIONS:     Current Outpatient Medications   Medication Sig Dispense Refill    clobetasol 0.05 % External Ointment Apply 1 Application. topically 2 (two) times daily.      Multiple Vitamin (MULTI-VITAMIN) Oral Tab Take 1 tablet by mouth daily.           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      The patient does live in a home with stairs.          REVIEW OF SYSTEMS:   A  comprehensive 10 point review of systems was completed.  Pertinent positives and negatives noted in the the HPI.      LABS:     Lab Results   Component Value Date     02/12/2021    A1C 5.7 (H) 02/12/2021     Lab Results   Component Value Date    WBC 5.50 02/12/2021    RBC 4.35 02/12/2021    HGB 12.5 02/12/2021    HCT 37.8 02/12/2021    MCV 86.9 02/12/2021    MCH 28.7 02/12/2021    MCHC 33.1 02/12/2021    RDW 13.4 02/12/2021     02/12/2021     Lab Results   Component Value Date    GLU 98 02/12/2021    BUN 12.0 02/12/2021    BUNCREA 17.0 02/12/2021    CREATSERUM 0.71 02/12/2021    CA 9.5 02/12/2021    ALKPHO 86 02/12/2021    AST 20 02/12/2021    ALT 16 02/12/2021    BILT 0.93 02/12/2021    TP 7.1 02/12/2021    ALB 4.4 02/12/2021    AGRATIO 1.5 05/02/2012     02/12/2021    K 4.19 02/12/2021     02/12/2021    CO2 25.0 02/12/2021     No results found for: \"PTP\", \"PT\", \"INR\"  Lab Results   Component Value Date    VITD 31.44 02/12/2021

## 2024-04-29 ENCOUNTER — LAB ENCOUNTER (OUTPATIENT)
Dept: LAB | Age: 59
End: 2024-04-29
Attending: INTERNAL MEDICINE
Payer: COMMERCIAL

## 2024-04-29 ENCOUNTER — OFFICE VISIT (OUTPATIENT)
Dept: INTERNAL MEDICINE CLINIC | Facility: CLINIC | Age: 59
End: 2024-04-29
Payer: COMMERCIAL

## 2024-04-29 VITALS
WEIGHT: 200.5 LBS | BODY MASS INDEX: 36.9 KG/M2 | SYSTOLIC BLOOD PRESSURE: 128 MMHG | DIASTOLIC BLOOD PRESSURE: 88 MMHG | OXYGEN SATURATION: 96 % | HEIGHT: 62 IN | HEART RATE: 75 BPM

## 2024-04-29 DIAGNOSIS — K42.9 UMBILICAL HERNIA WITHOUT OBSTRUCTION AND WITHOUT GANGRENE: ICD-10-CM

## 2024-04-29 DIAGNOSIS — R73.03 PRE-DIABETES: ICD-10-CM

## 2024-04-29 DIAGNOSIS — R10.11 RUQ PAIN: ICD-10-CM

## 2024-04-29 DIAGNOSIS — E55.9 VITAMIN D DEFICIENCY: ICD-10-CM

## 2024-04-29 DIAGNOSIS — E78.5 HYPERLIPIDEMIA, UNSPECIFIED HYPERLIPIDEMIA TYPE: Primary | ICD-10-CM

## 2024-04-29 LAB
EST. AVERAGE GLUCOSE BLD GHB EST-MCNC: 123 MG/DL (ref 68–126)
HBA1C MFR BLD: 5.9 % (ref ?–5.7)
VIT D+METAB SERPL-MCNC: 28.4 NG/ML (ref 30–100)

## 2024-04-29 PROCEDURE — 96127 BRIEF EMOTIONAL/BEHAV ASSMT: CPT | Performed by: INTERNAL MEDICINE

## 2024-04-29 PROCEDURE — 3079F DIAST BP 80-89 MM HG: CPT | Performed by: INTERNAL MEDICINE

## 2024-04-29 PROCEDURE — 3074F SYST BP LT 130 MM HG: CPT | Performed by: INTERNAL MEDICINE

## 2024-04-29 PROCEDURE — 82306 VITAMIN D 25 HYDROXY: CPT

## 2024-04-29 PROCEDURE — 99205 OFFICE O/P NEW HI 60 MIN: CPT | Performed by: INTERNAL MEDICINE

## 2024-04-29 PROCEDURE — 83036 HEMOGLOBIN GLYCOSYLATED A1C: CPT

## 2024-04-29 PROCEDURE — 36415 COLL VENOUS BLD VENIPUNCTURE: CPT

## 2024-04-29 PROCEDURE — 3008F BODY MASS INDEX DOCD: CPT | Performed by: INTERNAL MEDICINE

## 2024-04-29 NOTE — PROGRESS NOTES
Philomena Gutiérrez is a 58 year old female.  Chief Complaint   Patient presents with    Establish Care     NP Here today to establish care with new PCP. PAP completed 1/19/24 by Dr. Gloria Georges. Last Colonoscopy done 7/13/21.      HPI:   Philomena Gutiérrez is a 58 year old female who presents to establish care.    No previous PCP, managed by OBGYN.     C/o chronic \"throat clearing\". Seen by ENT and GI, denies GERD. Sx worse in the morning, resolve by end of the day. No hx allergies/asthma.    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.     Wt Readings from Last 6 Encounters:   04/29/24 200 lb 8 oz (90.9 kg)   04/17/24 195 lb (88.5 kg)   06/27/23 202 lb (91.6 kg)   01/23/23 204 lb (92.5 kg)   09/14/22 190 lb (86.2 kg)   08/17/22 190 lb (86.2 kg)     Body mass index is 36.67 kg/m².     Current Outpatient Medications   Medication Sig Dispense Refill    Multiple Vitamin (MULTI-VITAMIN) Oral Tab Take 1 tablet by mouth daily.        Past Medical History:    Carpal tunnel syndrome on right    Hair loss    seen by derm, taking nutrafol    Hypercholesterolemia    Obesity      Past Surgical History:   Procedure Laterality Date    Other surgical history  1985    s/p left knee arthroscopy for torn meniscus    Other surgical history  1990    s/p laparoscopy ovarian cyst and adhesions    Tonsillectomy      [place] teeth removed      Wrist arthroscop,release xvers lig Right 06/27/2023    Right endoscopic carpal tunnel release      Family History   Problem Relation Age of Onset    Hypertension Mother     Other (atrial fibrillation) Mother     Hypertension Father     No Known Problems Sister     Hypertension Maternal Grandfather     Other (alzheimer's) Paternal Grandmother     No Known Problems Son     No Known Problems Son     No Known Problems Daughter       Social History:   Social History     Socioeconomic History    Marital status:     Number of children: 3   Occupational History    Occupation: RN, L&D at [employer]   Tobacco Use    Smoking  status: Never    Smokeless tobacco: Never   Vaping Use    Vaping status: Never Used   Substance and Sexual Activity    Alcohol use: No    Drug use: No   Other Topics Concern    Right Handed Yes    Caffeine Concern No    Exercise Yes     Comment: cardio & weight training PRN   Social History Narrative    The patient does not use an assistive device..      The patient does live in a home with stairs.        REVIEW OF SYSTEMS:   GENERAL: feels well otherwise  SKIN: denies any unusual skin lesions  EYES:denies blurred vision or double vision  HEENT: denies nasal congestion, sinus pain, ST, sore throat  LUNGS: denies shortness of breath with exertion, cough or wheezing  CARDIOVASCULAR: denies chest pain, pressure, or palpitations  GI: denies abdominal pain, nausea, vomiting, diarrhea, constipation, hematochezia, or melena  : denies dysuria, hematuria  NEURO: denies headaches, dizziness, focal deficits  EXT: denies LE edema    EXAM:   /88   Pulse 75   Ht 5' 2\" (1.575 m)   Wt 200 lb 8 oz (90.9 kg)   SpO2 96%   BMI 36.67 kg/m²     GENERAL: well developed, well nourished, in no apparent distress  HEENT: normal oropharynx, normal TM's b/l  EYES: PERRLA, EOMI, conjunctivae are pink  NECK: supple, no cervical or supraclavicular LAD, no carotic bruits, no thyromegaly  LUNGS: clear to auscultation b/l, no w/r/r  CARDIO: RRR, normal S1S2, no m/r/g  GI: soft, ND, NABS, no HSM, small soft umbilical hernia, reducible, + mild ttp RUQ, no r/r/g  EXTREMITIES: no c/c/e, +2 radial and DP pulses bilaterally  NEURO: A&O x 3, moves all 4 extremities spontaneously      ASSESSMENT AND PLAN:   Philomena Gutiérrez is a 58 year old female who presents for a complete physical exam.    Encounter to establish care    Vitamin D deficiency  - Vitamin D [E]; Future    Pre-diabetes  - Hemoglobin A1C [E]; Future    Umbilical hernia without obstruction and without gangrene  - Surgery Referral - In Network    RUQ pain  - US ABDOMEN LIMITED  (CPT=76705); Future    Throat clearing  - possibly PND, recommend trial of flonase/zyrtec    HLD  The ASCVD Risk score (Heena DK, et al., 2019) failed to calculate for the following reasons:    Cannot find a previous HDL lab    Cannot find a previous total cholesterol lab  - recommend high intensity statin, patient declines and prefers to implement lifestyle changes first, will repeat lipids in 6 months  - CT CALCIUM SCORING; Future    Obesity  - discussed healthy diet/exercise  - considering WW/Mediterranean diet    Hyperbilirubinemia  - states this has been chronic since childhood  - ~1.6 since at least 2012    Hx PRINCE 1 s/p LEEP    Healthcare Maintenance  Cancer Screenings:  - Breast: birads 1 negative US birads 2 mammo 1/8/24  - Cervical: LSIL + HPV 1/19/24, s/p LEEP per gyn Dr. Georges   - Colon: per GI Dr. Cervantes repeat 7/2024    Vaccines:  - Tdap: believes she is UTD  - Shingles: shingrix recommended  - Pneumonia: n/a  - Flu: declines  - COVID-19: declines    Misc:  - DEXA: at age 66    RTC in 1 year or sooner PRN.    Labs ordered as above. Informed patient to expect messaging only if the labs are abnormal via patient preferred method of communication (MyChart).    For E/M code - 60 minutes spent reviewing performing chart review, obtaining a history, performing a physical exam, reviewing the assessment/plan, placing orders, and completing documentation.     Zulay Stark DO  4/29/2024  10:05 AM

## 2024-04-30 ENCOUNTER — TELEPHONE (OUTPATIENT)
Dept: INTERNAL MEDICINE CLINIC | Facility: CLINIC | Age: 59
End: 2024-04-30

## 2024-05-15 ENCOUNTER — OFFICE VISIT (OUTPATIENT)
Dept: PHYSICAL MEDICINE AND REHAB | Facility: CLINIC | Age: 59
End: 2024-05-15

## 2024-05-15 VITALS — HEIGHT: 62 IN | WEIGHT: 200 LBS | RESPIRATION RATE: 18 BRPM | BODY MASS INDEX: 36.8 KG/M2

## 2024-05-15 DIAGNOSIS — G56.02 CARPAL TUNNEL SYNDROME OF LEFT WRIST: Primary | ICD-10-CM

## 2024-05-15 PROCEDURE — 3008F BODY MASS INDEX DOCD: CPT | Performed by: PHYSICAL MEDICINE & REHABILITATION

## 2024-05-15 PROCEDURE — 99214 OFFICE O/P EST MOD 30 MIN: CPT | Performed by: PHYSICAL MEDICINE & REHABILITATION

## 2024-05-20 ENCOUNTER — OFFICE VISIT (OUTPATIENT)
Dept: SURGERY | Facility: CLINIC | Age: 59
End: 2024-05-20

## 2024-05-20 DIAGNOSIS — G56.02 LEFT CARPAL TUNNEL SYNDROME: Primary | ICD-10-CM

## 2024-05-20 PROCEDURE — 99214 OFFICE O/P EST MOD 30 MIN: CPT | Performed by: PLASTIC SURGERY

## 2024-05-20 RX ORDER — HYDROCODONE BITARTRATE AND ACETAMINOPHEN 7.5; 325 MG/1; MG/1
1 TABLET ORAL
Qty: 10 TABLET | Refills: 0 | Status: SHIPPED | OUTPATIENT
Start: 2024-05-20

## 2024-05-20 NOTE — PROGRESS NOTES
Patient request for surgery signed by patient and witnessed and signed by RN.  Prescription for norco 7.5 mg electronically sent to pharmacy per Dr. Pagan's order and patient instructed to  prescription before surgery.   Pre-Surgical Instruction Handout, Hand Elevation Handout, and Post-Operative Instruction Handout given to and reviewed w/patient.  All questions and concerns answered; pt verbalized an understanding of all pre-operative teaching.  Patient instructed to call the office with any further questions and/or concerns.  Patient will call at a later date to schedule surgery and post-operative appointments.

## 2024-05-20 NOTE — H&P
Philomena Gutiérrez is a 58 year old female that presents with   Chief Complaint   Patient presents with    Carpal Tunnel Syndrome     LCTS   .    REFERRED BY:  No ref. provider found     NO LECTR      Pacemaker: No  Latex Allergy: no  Coumadin: No  Plavix: No  Other anticoagulants: No  Diet medication No  Cardiac stents: No    HAND DOMINANCE:  Right  Profession: LABOR AND DELIVERY RN    RECONSTRUCTIVE HISTORY    SUN EXPOSURE   Current no   Past yes   Sunburns yes   Tanning salons current no   Tanning salons past no   Radiation treatments No     SKIN CANCER    Personal history of skin cancer: none    HAND     Previous hand injury (personal)    No            ++++++++++++++++++++++++++++++++++++++++++++++++++    ELECTRODIAGNOSTIC STUDIES  -   ANGULO    DATE: 12/15/2022    RIGHT  Carpal Tunnel Syndrome: severe  Motor Latency: 8.18  Palmar Sensory Latency:  Sensory Antidromic: 5.05  APB Normal: no  Neurogenic Changes: no  Denervation Potentials: yes      LEFT  Carpal Tunnel Syndrome: mild  Motor Latency: 5.83  Palmar Sensory Latency:  Sensory Antidromic: 5.00  APB Normal: yes  Neurogenic Changes: no  Denervation Potentials: no      HPI:       58-year-old female right-hand-dominant with left carpal tunnel symptoms    2 years duration worsening    Constant numbness thumb, index, middle  \"Zinging\" pains intermittently    Awakened every other night with numbness and tingling    She drops things in the hand is weak    2 steroid injections with some relief but has recurrence symptoms          Review of Systems:   Constitutional: No change in appetite, chill/rigors, or fatigue  GI: No jaundice  Endocrine: No generalized weakness  Neurological: No aphasia, loss of consciousness, or seizures    Musculoskeletal:     Duration    2 years    NUMBNESS / TINGLING        LEFT  Constant  thumb  index finger  middle finger    Pain:    Yes 2/10    Night symptoms:  Yes awakened every night with numbness and tingling    Functional problems: yes  drops objects and weakness    Previous therapy:  yes    Pt had 2 steroid injections on the past  Wearing brace at night     PMH:     MEDICAL  Past Medical History:    Carpal tunnel syndrome on right    Hair loss    seen by derm, taking nutrafol    Hypercholesterolemia    Obesity        SURGICAL  Past Surgical History:   Procedure Laterality Date    Other surgical history  1985    s/p left knee arthroscopy for torn meniscus    Other surgical history  1990    s/p laparoscopy ovarian cyst and adhesions    Tonsillectomy      [place] teeth removed      Wrist arthroscop,release xvers lig Right 06/27/2023    Right endoscopic carpal tunnel release        ALLERIGIES  Allergies   Allergen Reactions    Amoxicillin RASH        MEDICATIONS  Current Outpatient Medications   Medication Sig Dispense Refill    Multiple Vitamin (MULTI-VITAMIN) Oral Tab Take 1 tablet by mouth daily.          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      The patient does live in a home with stairs.        FAMILY HISTORY  Family History   Problem Relation Age of Onset    Hypertension Mother     Other (atrial fibrillation) Mother     Hypertension Father     No Known Problems Sister     Hypertension Maternal Grandfather     Other (alzheimer's) Paternal Grandmother     No Known Problems Son     No Known Problems Son     No Known Problems Daughter           PHYSICAL EXAM:     CONSTITUTIONAL: Overall appearance - Normal  HEENT: Normocephalic  EYES: Conjunctiva -  Right: Normal, Left: Normal; EOMI  EARS: Inspection - Right: Normal, Left: Normal  NECK/THYROID: Inspection - Normal, Palpation - Normal, Thyroid gland - Normal, No adenopathy  RESPIRATORY: Inspection - Normal, Effort - Normal  CARDIOVASCULAR: Regular rhythm, No murmurs  ABDOMEN: Inspection - Normal, No abdominal tenderness  NEURO: Memory intact  PSYCH: Oriented to person, place, time, and situation, Appropriate mood and affect        Hand Physical Exam:    ROM: bilateral full painless  Wrist Hyperextension: bilateral excellent  Thenar Intrinsics: bilateral intact/symmetric  Ulnar Intrinsics: bilateral intact/symmetric  Wrist Tenderness: bilateral none  Sensation: bilateral grossly intact    Median Nerve Compression: LMF    ASSESSMENT/PLAN:       CARPAL TUNNEL SYNDROME  left    DISPOSITION    We had a long discussion.  I explained to the patient what carpal tunnel syndrome is including treatment options.  The patient  may not have relief of symptoms with treatment.      This is a moderate carpal tunnel syndrome.  Refractory to steroid injections    Because of the severity (see Severity Index) of this carpal tunnel, non-operative treatment is not indicated. This requires surgery.  We discussed the procedure at length, including risks as indicated on the Surgical Request Form.  Questions were answered and the patient wishes to proceed with treatment.    Some symptoms may persist   Some symptoms may not improve   Some symptoms may worsen   Further surgery may be necessary    Even with surgery that decompresses the nerve, nerve function may not return.  After relief of pressure on the nerve, the nerve must recover.  This could take up to a year, but the nerve may not recover even after pressure is relieved, so symptoms may persist.    POST-OPERATIVE  PROTOCOL:  We discussed post-operative restrictions at length.  It is critical to maintain the dressing post-operatively and to comply with post-operative instructions.   The dressing must be carefully cared for, must remain dry, and cannot be removed under any circumstance.  Consistent elevation of the hand above heart level is critical.  Therapy will be necessary post-operatively.  Failure to comply with post-operative instructions, including therapy, will have significant impact on the final result, and could lead to permanent pain, stiffness, weakness, and loss of function.    Even with satisfactory healing, the hand/digit may not regain normal ROM or normal function.    PLAN    LECTR          5/20/2024  Romario Pagan MD      [facility] Data Reviewed.               +++++++++++++++++++++++++++++++++++++++++++++++++    MEDICAL DECISION MAKING    PROBLEMS      MODERATE    (number / complexity)          Undiagnosed new problem with uncertain prognosis    DATA         STRAIGHTFORWARD    (amount / complexity)           MANAGEMENT RISK  HIGH    (complications/ morbidity)       Major surgery with risk factors                  MDM LEVEL    MODERATE

## 2024-05-23 NOTE — PROGRESS NOTES
[facility]  OFFICE FOLLOW UP EVALUATION      HISTORY OF PRESENT ILLNESS:     Chief Complaint   Patient presents with    Follow - Up     Returning patient here for follow up. LOV 4/17/24 where a Ultrasound guided left carpal tunnel injection was performed. Today, reports sx's are greatly improved. Reports +80% relief and this continues. Denies pain. + mild intermittent numbness in wrist/hand but once again improved. Continues brace at night and home exercises.        Patient is following up for ultrasound-guided left carpal tunnel injection.  She states she had great improvement but still has symptoms related to the carpal tunnel with numbness tingling sensation.  She has occasionally sharp shooting pains that can worsen.  She is continuing bracing at nighttime and home exercises.    PHYSICAL EXAM:   Resp 18   Ht 62\"   Wt 200 lb (90.7 kg)   BMI 36.58 kg/m²     WRIST:  Inspection: no erythema, swelling, or obvious deformity  Palpation: no ttp in carpal bones or areas of pain  ROM: intact to all planes of motion  Strength: 5/5  Sensation: Intact to light touch in all dermatomes of the lower extremities  Tinel's test: positive for reproducible symptoms    IMAGING:     None    All imaging results were reviewed and discussed with patient.      ASSESSMENT/PLAN:     1. Carpal tunnel syndrome of left wrist        Philomena Gutiérrez is a 58 year old female following up for carpal tunnel symptoms.  I recommended orthopedic evaluation for further consideration of any surgical intervention.  Recommend she continue topical treatment, home exercises and bracing as tolerated.  She will follow-up with me in 3 months.    The patient verbalized understanding with the plan and was in agreement. All questions/concerns were addressed and there were no barriers to learning.  Please note Dragon dictation software was used to dictate this note and may result in inadvertent typos.    Yvette  Jarod DUNAWAY, FAAPMR & CAQSM  Physical Medicine and Rehabilitation  Sports and Spine Medicine    PAST MEDICAL HISTORY:     Past Medical History:    Carpal tunnel syndrome on right    Hair loss    seen by derm, taking nutrafol    Hypercholesterolemia    Obesity         PAST SURGICAL HISTORY:     Past Surgical History:   Procedure Laterality Date    Other surgical history  1985    s/p left knee arthroscopy for torn meniscus    Other surgical history  1990    s/p laparoscopy ovarian cyst and adhesions    Tonsillectomy      [place] teeth removed      Wrist arthroscop,release xvers lig Right 06/27/2023    Right endoscopic carpal tunnel release         CURRENT MEDICATIONS:     Current Outpatient Medications   Medication Sig Dispense Refill    Multiple Vitamin (MULTI-VITAMIN) Oral Tab Take 1 tablet by mouth daily.      HYDROcodone-acetaminophen 7.5-325 MG Oral Tab Take 1 tablet by mouth every 4 to 6 hours as needed for Pain. 10 tablet 0         ALLERGIES:     Allergies   Allergen Reactions    Amoxicillin RASH         FAMILY HISTORY:     Family History   Problem Relation Age of Onset    Hypertension Mother     Other (atrial fibrillation) Mother     Hypertension Father     No Known Problems Sister     Hypertension Maternal Grandfather     Other (alzheimer's) Paternal Grandmother     No Known Problems Son     No Known Problems Son     No Known Problems Daughter           SOCIAL HISTORY:     Social History     Socioeconomic History    Marital status:     Number of children: 3   Occupational History    Occupation: RN, 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      The patient does  live in a home with stairs.          REVIEW OF SYSTEMS:   A comprehensive 10 point review of systems was completed.  Pertinent positives and negatives noted in the the HPI.      LABS:     Lab Results   Component Value Date     04/29/2024    A1C 5.9 (H) 04/29/2024     Lab Results   Component Value Date    WBC 5.50 02/12/2021    RBC 4.35 02/12/2021    HGB 12.5 02/12/2021    HCT 37.8 02/12/2021    MCV 86.9 02/12/2021    MCH 28.7 02/12/2021    MCHC 33.1 02/12/2021    RDW 13.4 02/12/2021     02/12/2021     Lab Results   Component Value Date    GLU 98 02/12/2021    BUN 12.0 02/12/2021    BUNCREA 17.0 02/12/2021    CREATSERUM 0.71 02/12/2021    CA 9.5 02/12/2021    ALKPHO 86 02/12/2021    AST 20 02/12/2021    ALT 16 02/12/2021    BILT 0.93 02/12/2021    TP 7.1 02/12/2021    ALB 4.4 02/12/2021    AGRATIO 1.5 05/02/2012     02/12/2021    K 4.19 02/12/2021     02/12/2021    CO2 25.0 02/12/2021     No results found for: \"PTP\", \"PT\", \"INR\"  Lab Results   Component Value Date    VITD 28.4 (L) 04/29/2024

## 2024-07-09 ENCOUNTER — TELEPHONE (OUTPATIENT)
Dept: SURGERY | Facility: CLINIC | Age: 59
End: 2024-07-09

## 2024-07-19 ENCOUNTER — TELEPHONE (OUTPATIENT)
Dept: INTERNAL MEDICINE CLINIC | Facility: CLINIC | Age: 59
End: 2024-07-19

## 2024-07-19 NOTE — TELEPHONE ENCOUNTER
Colonoscopy w/Chelsy Cervantes on 7/2/24 w/ f/u path tentative 5 year f/u recommended. Care gap updated.

## 2024-08-06 ENCOUNTER — HOSPITAL ENCOUNTER (OUTPATIENT)
Dept: CT IMAGING | Age: 59
Discharge: HOME | End: 2024-08-06
Attending: INTERNAL MEDICINE

## 2024-08-06 DIAGNOSIS — E78.5 HYPERLIPIDEMIA, UNSPECIFIED HYPERLIPIDEMIA TYPE: ICD-10-CM

## 2024-08-06 LAB
POCT GLUCOSE CHOLESTECH: 135 (ref 70–140)
POCT HDL: 43 (ref 55–70)
POCT LDL: 198 (ref 0–99)
POCT TOTAL CHOLESTEROL: 284 (ref 110–200)
POCT TRIGLYCERIDES: 217 (ref 1–149)

## 2024-08-22 ENCOUNTER — TELEPHONE (OUTPATIENT)
Dept: SURGERY | Facility: CLINIC | Age: 59
End: 2024-08-22

## 2024-08-22 NOTE — TELEPHONE ENCOUNTER
Received disability forms via fax, scanned and email to forms department no payment or ALEXANDRO was collected.

## 2024-08-23 ENCOUNTER — TELEPHONE (OUTPATIENT)
Dept: INTERNAL MEDICINE CLINIC | Facility: CLINIC | Age: 59
End: 2024-08-23

## 2024-08-23 DIAGNOSIS — I28.8 DILATED PULMONARY TRUNK (HCC): ICD-10-CM

## 2024-08-23 DIAGNOSIS — R91.1 LUNG NODULE: Primary | ICD-10-CM

## 2024-08-26 ENCOUNTER — MED REC SCAN ONLY (OUTPATIENT)
Dept: INTERNAL MEDICINE CLINIC | Facility: CLINIC | Age: 59
End: 2024-08-26

## 2024-08-26 NOTE — TELEPHONE ENCOUNTER
Receive Family Medical Leave Act Forms in the forms department. Sent My Chart Message for Release of Information required. Logged for processing.

## 2024-09-03 NOTE — TELEPHONE ENCOUNTER
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

## 2024-09-04 NOTE — TELEPHONE ENCOUNTER
Dr. Pagan,    Please sign off on form if you agree to: Disability due to Left endoscopic carpal tunnel release 9/17/24 to 6 weeks(10/29/24)    -Signature page will be the first page scanned  -From your Inbasket, Highlight the patient and click Chart   -Double click the 8/22/24 Forms Completion telephone encounter  -Scroll down to the Media section   -Click the blue Hyperlink: Disability, Dr. Pagan, 9/4/24    -Click Acknowledge located in the top right ribbon/menu   -Drag the mouse into the blank space of the document and a + sign will appear. Left click to   electronically sign the document.  -Once signed, simply exit out of the screen and you signature will be saved.     Thank you,  Alberta FERREIRA

## 2024-09-05 NOTE — TELEPHONE ENCOUNTER
Forms completed and faxed to [employer] [facility] [phone]. TEEspy message sent, fax confirm rec'd

## 2024-09-09 ENCOUNTER — TELEPHONE (OUTPATIENT)
Dept: SURGERY | Facility: CLINIC | Age: 59
End: 2024-09-09

## 2024-09-09 NOTE — TELEPHONE ENCOUNTER
Spoke with patient.    All changes to medications and allergies, per patient report, have been documented in the medical record.     Patient  has not been ill, hospitalized, or had any surgical procedures since last seen in our office on 5/20/24    Dr. Pagan notified of medication deletions and additions and allergy update.

## 2024-09-11 ENCOUNTER — TELEPHONE (OUTPATIENT)
Dept: SURGERY | Facility: CLINIC | Age: 59
End: 2024-09-11

## 2024-09-11 NOTE — TELEPHONE ENCOUNTER
Received Disability forms via from from [employer] [facility]. No ALEXANDRO or Payment was collected. Scanned and email to forms department.

## 2024-09-16 NOTE — DISCHARGE INSTRUCTIONS
HOME INSTRUCTIONS    Dr Pagan Discharge Instructions      Romario Pagan M.D.   [phone]  Plastic and Reconstructive Surgery, Hand Surgery  [address], [address]  [place] [address]     GENERAL INSTRUCTIONS:  Do not remove dressing for any reason.  Keep dressing clean and dry.  Some drainage (blood and fluid) through the dressing is expected.  Some swelling is normal.  Take medications as directed.  Do not drive.    HANDS:  Keep elevated (above heart-level) at all times.  Check fingertips for circulation.  Start hand exercises today as instructed by occupational therapy   Keep in a sling at all times.                 YOU HAVE AN APPOINTMENT AT THE OFFICE ON _________09/18/2024________    PLEASE CALL THE OFFICE _____________________________________     AND MAKE AN APPOINTMENT FOR ______________________________             [facility] HOME CARE INSTRUCTIONS: POST-OP ANESTHESIA  The medication that you received for sedation or general anesthesia can last up to 24 hours. Your judgment and reflexes may be altered, even if you feel like your normal self.      We Recommend:   Do not drive any motor vehicle or bicycle   Avoid mowing the lawn, playing sports, or working with power tools/applicances (power saws, electric knives or mixers)   That you have someone stay with you on your first night home   Do not drink alcohol or take sleeping pills or tranquilizers   Do not sign legal documents within 24 hours of your procedure   If you had a nerve block for your surgery, take extra care not to put any pressure on your arm or hand for 24 hours    It is normal:  For you to have a sore throat if you had a breathing tube during surgery (while you were asleep!). The sore throat should get better within 48 hours. You can gargle with warm salt water (1/2 tsp in 4 oz warm water) or use a throat lozenge for comfort  To feel muscle aches or soreness especially in the abdomen, chest or neck. The achy  feeling should go away in the next 24 hours  To feel weak, sleepy or \"wiped out\". Your should start feeling better in the next 24 hours.   To experience mild discomforts such as sore lip or tongue, headache, cramps, gas pains or a bloated feeling in your abdomen.   To experience mild back pain or soreness for a day or two if you had spinal or epidural anesthesia.   If you had laparoscopic surgery, to feel shoulder pain or discomfort on the day of surgery.   For some patients to have nausea after surgery/anesthesia    If you feel nausea or experience vomiting:   Try to move around less.   Eat less than usual or drink only liquids until the next morning   Nausea should resolve in about 24 hours    If you have a problem when you are at home:    Call your surgeons office   Discharge Instructions: After Your Surgery  You’ve just had surgery. During surgery, you were given medicine called anesthesia to keep you relaxed and free of pain. After surgery, you may have some pain or nausea. This is common. Here are some tips for feeling better and getting well after surgery.   Going home  Your healthcare provider will show you how to take care of yourself when you go home. They'll also answer your questions. Have an adult family member or friend drive you home. For the first 24 hours after your surgery:   Don't drive or use heavy equipment.  Don't make important decisions or sign legal papers.  Take medicines as directed.  Don't drink alcohol.  Have someone stay with you, if needed. They can watch for problems and help keep you safe.  Be sure to go to all follow-up visits with your healthcare provider. And rest after your surgery for as long as your provider tells you to.   Coping with pain  If you have pain after surgery, pain medicine will help you feel better. Take it as directed, before pain becomes severe. Also, ask your healthcare provider or pharmacist about other ways to control pain. This might be with heat, ice, or  relaxation. And follow any other instructions your surgeon or nurse gives you.      Stay on schedule with your medicine.     Tips for taking pain medicine  To get the best relief possible, remember these points:   Pain medicines can upset your stomach. Taking them with a little food may help.  Most pain relievers taken by mouth need at least 20 to 30 minutes to start to work.  Don't wait till your pain becomes severe before you take your medicine. Try to time your medicine so that you can take it before starting an activity. This might be before you get dressed, go for a walk, or sit down for dinner.  Constipation is a common side effect of some pain medicines. Call your healthcare provider before taking any medicines such as laxatives or stool softeners to help ease constipation. Also ask if you should skip any foods. Drinking lots of fluids and eating foods such as fruits and vegetables that are high in fiber can also help. Remember, don't take laxatives unless your surgeon has prescribed them.  Drinking alcohol and taking pain medicine can cause dizziness and slow your breathing. It can even be deadly. Don't drink alcohol while taking pain medicine.  Pain medicine can make you react more slowly to things. Don't drive or run machinery while taking pain medicine.  Your healthcare provider may tell you to take acetaminophen to help ease your pain. Ask them how much you're supposed to take each day. Acetaminophen or other pain relievers may interact with your prescription medicines or other over-the-counter (OTC) medicines. Some prescription medicines have acetaminophen and other ingredients in them. Using both prescription and OTC acetaminophen for pain can cause you to accidentally overdose. Read the labels on your OTC medicines with care. This will help you to clearly know the list of ingredients, how much to take, and any warnings. It may also help you not take too much acetaminophen. If you have questions or  don't understand the information, ask your pharmacist or healthcare provider to explain it to you before you take the OTC medicine.   Managing nausea  Some people have an upset stomach (nausea) after surgery. This is often because of anesthesia, pain, or pain medicine, less movement of food in the stomach, or the stress of surgery. These tips will help you handle nausea and eat healthy foods as you get better. If you were on a special food plan before surgery, ask your healthcare provider if you should follow it while you get better. Check with your provider on how your eating should progress. It may depend on the surgery you had. These general tips may help:   Don't push yourself to eat. Your body will tell you when to eat and how much.  Start off with clear liquids and soup. They're easier to digest.  Next try semi-solid foods as you feel ready. These include mashed potatoes, applesauce, and gelatin.  Slowly move to solid foods. Don’t eat fatty, rich, or spicy foods at first.  Don't force yourself to have 3 large meals a day. Instead eat smaller amounts more often.  Take pain medicines with a small amount of solid food, such as crackers or toast. This helps prevent nausea.  When to call your healthcare provider  Call your healthcare provider right away if you have any of these:   You still have too much pain, or the pain gets worse, after taking the medicine. The medicine may not be strong enough. Or there may be a complication from the surgery.  You feel too sleepy, dizzy, or groggy. The medicine may be too strong.  Side effects such as nausea or vomiting. Your healthcare provider may advise taking other medicines to .  Skin changes such as rash, itching, or hives. This may mean you have an allergic reaction. Your provider may advise taking other medicines.  The incision looks different (for instance, part of it opens up).  Bleeding or fluid leaking from the incision site, and weren't told to expect that.  Fever  of 100.4°F (38°C) or higher, or as directed by your provider.  Call 911  Call 911 right away if you have:   Trouble breathing  Facial swelling    If you have obstructive sleep apnea   You were given anesthesia medicine during surgery to keep you comfortable and free of pain. After surgery, you may have more apnea spells because of this medicine and other medicines you were given. The spells may last longer than normal.    At home:  Keep using the continuous positive airway pressure (CPAP) device when you sleep. Unless your healthcare provider tells you not to, use it when you sleep, day or night. CPAP is a common device used to treat obstructive sleep apnea.  Talk with your provider before taking any pain medicine, muscle relaxants, or sedatives. Your provider will tell you about the possible dangers of taking these medicines.  Contact your provider if your sleeping changes a lot even when taking medicines as directed.  [employer] last reviewed this educational content on 10/1/2021  © 8138-7569 [employer]. All rights reserved. This information is not intended as a substitute for professional medical care. Always follow your healthcare professional's instructions.

## 2024-09-17 ENCOUNTER — ANESTHESIA (OUTPATIENT)
Dept: SURGERY | Facility: HOSPITAL | Age: 59
End: 2024-09-17
Payer: COMMERCIAL

## 2024-09-17 ENCOUNTER — ANESTHESIA EVENT (OUTPATIENT)
Dept: SURGERY | Facility: HOSPITAL | Age: 59
End: 2024-09-17
Payer: COMMERCIAL

## 2024-09-17 ENCOUNTER — HOSPITAL DOCUMENTATION (OUTPATIENT)
Dept: SURGERY | Facility: CLINIC | Age: 59
End: 2024-09-17

## 2024-09-17 ENCOUNTER — HOSPITAL ENCOUNTER (OUTPATIENT)
Facility: HOSPITAL | Age: 59
Setting detail: HOSPITAL OUTPATIENT SURGERY
Discharge: HOME | End: 2024-09-17
Attending: PLASTIC SURGERY | Admitting: PLASTIC SURGERY
Payer: COMMERCIAL

## 2024-09-17 VITALS
WEIGHT: 200.81 LBS | OXYGEN SATURATION: 97 % | RESPIRATION RATE: 14 BRPM | HEART RATE: 67 BPM | BODY MASS INDEX: 36.95 KG/M2 | HEIGHT: 62 IN | DIASTOLIC BLOOD PRESSURE: 86 MMHG | SYSTOLIC BLOOD PRESSURE: 144 MMHG | TEMPERATURE: 97 F

## 2024-09-17 DIAGNOSIS — G56.02 LEFT CARPAL TUNNEL SYNDROME: Primary | ICD-10-CM

## 2024-09-17 PROCEDURE — 0MN60ZZ RELEASE LEFT WRIST BURSA AND LIGAMENT, OPEN APPROACH: ICD-10-PCS | Performed by: PLASTIC SURGERY

## 2024-09-17 RX ORDER — FAMOTIDINE 20 MG/1
20 TABLET, FILM COATED ORAL ONCE
Status: COMPLETED | OUTPATIENT
Start: 2024-09-17 | End: 2024-09-17

## 2024-09-17 RX ORDER — MORPHINE SULFATE 10 MG/ML
6 INJECTION, SOLUTION INTRAMUSCULAR; INTRAVENOUS EVERY 10 MIN PRN
Status: DISCONTINUED | OUTPATIENT
Start: 2024-09-17 | End: 2024-09-17

## 2024-09-17 RX ORDER — METOCLOPRAMIDE 10 MG/1
10 TABLET ORAL ONCE
Status: COMPLETED | OUTPATIENT
Start: 2024-09-17 | End: 2024-09-17

## 2024-09-17 RX ORDER — HYDROMORPHONE HYDROCHLORIDE 1 MG/ML
0.2 INJECTION, SOLUTION INTRAMUSCULAR; INTRAVENOUS; SUBCUTANEOUS EVERY 5 MIN PRN
Status: DISCONTINUED | OUTPATIENT
Start: 2024-09-17 | End: 2024-09-17

## 2024-09-17 RX ORDER — MORPHINE SULFATE 4 MG/ML
2 INJECTION, SOLUTION INTRAMUSCULAR; INTRAVENOUS EVERY 10 MIN PRN
Status: DISCONTINUED | OUTPATIENT
Start: 2024-09-17 | End: 2024-09-17

## 2024-09-17 RX ORDER — KETOROLAC TROMETHAMINE 30 MG/ML
INJECTION, SOLUTION INTRAMUSCULAR; INTRAVENOUS AS NEEDED
Status: DISCONTINUED | OUTPATIENT
Start: 2024-09-17 | End: 2024-09-17 | Stop reason: SURG

## 2024-09-17 RX ORDER — ONDANSETRON 2 MG/ML
4 INJECTION INTRAMUSCULAR; INTRAVENOUS EVERY 6 HOURS PRN
Status: DISCONTINUED | OUTPATIENT
Start: 2024-09-17 | End: 2024-09-17

## 2024-09-17 RX ORDER — PROCHLORPERAZINE EDISYLATE 5 MG/ML
5 INJECTION INTRAMUSCULAR; INTRAVENOUS EVERY 8 HOURS PRN
Status: DISCONTINUED | OUTPATIENT
Start: 2024-09-17 | End: 2024-09-17

## 2024-09-17 RX ORDER — ACETAMINOPHEN 500 MG
1000 TABLET ORAL ONCE AS NEEDED
Status: DISCONTINUED | OUTPATIENT
Start: 2024-09-17 | End: 2024-09-17

## 2024-09-17 RX ORDER — METOCLOPRAMIDE HYDROCHLORIDE 5 MG/ML
10 INJECTION INTRAMUSCULAR; INTRAVENOUS ONCE
Status: COMPLETED | OUTPATIENT
Start: 2024-09-17 | End: 2024-09-17

## 2024-09-17 RX ORDER — SODIUM CHLORIDE, SODIUM LACTATE, POTASSIUM CHLORIDE, CALCIUM CHLORIDE 600; 310; 30; 20 MG/100ML; MG/100ML; MG/100ML; MG/100ML
INJECTION, SOLUTION INTRAVENOUS CONTINUOUS
Status: DISCONTINUED | OUTPATIENT
Start: 2024-09-17 | End: 2024-09-17

## 2024-09-17 RX ORDER — HYDROMORPHONE HYDROCHLORIDE 1 MG/ML
0.4 INJECTION, SOLUTION INTRAMUSCULAR; INTRAVENOUS; SUBCUTANEOUS EVERY 5 MIN PRN
Status: DISCONTINUED | OUTPATIENT
Start: 2024-09-17 | End: 2024-09-17

## 2024-09-17 RX ORDER — LIDOCAINE HYDROCHLORIDE 5 MG/ML
INJECTION, SOLUTION INFILTRATION; INTRAVENOUS AS NEEDED
Status: DISCONTINUED | OUTPATIENT
Start: 2024-09-17 | End: 2024-09-17 | Stop reason: SURG

## 2024-09-17 RX ORDER — NALOXONE HYDROCHLORIDE 0.4 MG/ML
80 INJECTION, SOLUTION INTRAMUSCULAR; INTRAVENOUS; SUBCUTANEOUS AS NEEDED
Status: DISCONTINUED | OUTPATIENT
Start: 2024-09-17 | End: 2024-09-17

## 2024-09-17 RX ORDER — HYDROCODONE BITARTRATE AND ACETAMINOPHEN 5; 325 MG/1; MG/1
1 TABLET ORAL ONCE AS NEEDED
Status: DISCONTINUED | OUTPATIENT
Start: 2024-09-17 | End: 2024-09-17

## 2024-09-17 RX ORDER — HYDROMORPHONE HYDROCHLORIDE 1 MG/ML
0.6 INJECTION, SOLUTION INTRAMUSCULAR; INTRAVENOUS; SUBCUTANEOUS EVERY 5 MIN PRN
Status: DISCONTINUED | OUTPATIENT
Start: 2024-09-17 | End: 2024-09-17

## 2024-09-17 RX ORDER — FAMOTIDINE 10 MG/ML
20 INJECTION, SOLUTION INTRAVENOUS ONCE
Status: COMPLETED | OUTPATIENT
Start: 2024-09-17 | End: 2024-09-17

## 2024-09-17 RX ORDER — MORPHINE SULFATE 4 MG/ML
4 INJECTION, SOLUTION INTRAMUSCULAR; INTRAVENOUS EVERY 10 MIN PRN
Status: DISCONTINUED | OUTPATIENT
Start: 2024-09-17 | End: 2024-09-17

## 2024-09-17 RX ORDER — HYDROCODONE BITARTRATE AND ACETAMINOPHEN 7.5; 325 MG/1; MG/1
1 TABLET ORAL EVERY 4 HOURS PRN
Status: DISCONTINUED | OUTPATIENT
Start: 2024-09-17 | End: 2024-09-17

## 2024-09-17 RX ORDER — HYDROCODONE BITARTRATE AND ACETAMINOPHEN 5; 325 MG/1; MG/1
2 TABLET ORAL ONCE AS NEEDED
Status: DISCONTINUED | OUTPATIENT
Start: 2024-09-17 | End: 2024-09-17

## 2024-09-17 RX ORDER — ACETAMINOPHEN 500 MG
1000 TABLET ORAL ONCE
Status: COMPLETED | OUTPATIENT
Start: 2024-09-17 | End: 2024-09-17

## 2024-09-17 RX ORDER — MIDAZOLAM HYDROCHLORIDE 1 MG/ML
INJECTION INTRAMUSCULAR; INTRAVENOUS AS NEEDED
Status: DISCONTINUED | OUTPATIENT
Start: 2024-09-17 | End: 2024-09-17 | Stop reason: SURG

## 2024-09-17 RX ADMIN — SODIUM CHLORIDE, SODIUM LACTATE, POTASSIUM CHLORIDE, CALCIUM CHLORIDE: 600; 310; 30; 20 INJECTION, SOLUTION INTRAVENOUS at 10:32:00

## 2024-09-17 RX ADMIN — LIDOCAINE HYDROCHLORIDE 50 ML: 5 INJECTION, SOLUTION INFILTRATION; INTRAVENOUS at 09:54:00

## 2024-09-17 RX ADMIN — MIDAZOLAM HYDROCHLORIDE 2 MG: 1 INJECTION INTRAMUSCULAR; INTRAVENOUS at 09:43:00

## 2024-09-17 RX ADMIN — KETOROLAC TROMETHAMINE 30 MG: 30 INJECTION, SOLUTION INTRAMUSCULAR; INTRAVENOUS at 10:31:00

## 2024-09-17 NOTE — OR PREOP
Philomena Gutiérrez Patient Status:  Hospital Outpatient Surgery    1965 MRN [id number]   Location [facility] PRE OP RECOVERY Attending Romario Bingham MD   Hosp Day # 0 PCP Zulay Stark DO     Patient is unable to remove jewelry from  R wrist  Anesthesiologist aware and have deemed it safe to proceed.   Patient is aware of risks of proceeding with surgery with jewelry in place.

## 2024-09-17 NOTE — ANESTHESIA PREPROCEDURE EVALUATION
Anesthesia PreOp Note    HPI:     Philomena Gutiérrez is a 59 year old female who presents for preoperative consultation requested by: Romario Bingham MD    Date of Surgery: 9/17/2024    Procedure(s):  Left endoscopic carpal tunnel release  Indication: Carpal tunnel syndrome on left [G56.02]    Relevant Problems   No relevant active problems       NPO:  Last Liquid Consumption Date: 09/16/24  Last Liquid Consumption Time: 2230  Last Solid Consumption Date: 09/16/24  Last Solid Consumption Time: 1800  Last Liquid Consumption Date: 09/16/24          History Review:  Patient Active Problem List    Diagnosis Date Noted    Observation for suspected condition 06/27/2023    Carpal tunnel syndrome of right wrist 11/02/2021    Hair loss 02/04/2021    Obesity        Past Medical History:    Carpal tunnel syndrome on right    Hair loss    seen by derm, taking nutrafol    Hypercholesterolemia    Obesity       Past Surgical History:   Procedure Laterality Date    Other surgical history  1985    s/p left knee arthroscopy for torn meniscus    Other surgical history  1990    s/p laparoscopy ovarian cyst and adhesions    Tonsillectomy      [place] teeth removed      Wrist arthroscop,release xvers lig Right 06/27/2023    Right endoscopic carpal tunnel release       Medications Prior to Admission   Medication Sig Dispense Refill Last Dose    HYDROcodone-acetaminophen 7.5-325 MG Oral Tab Take 1 tablet by mouth every 4 to 6 hours as needed for Pain. (Patient not taking: Reported on 9/5/2024) 10 tablet 0 Not Taking     Current Facility-Administered Medications Ordered in Epic   Medication Dose Route Frequency Provider Last Rate Last Admin    lactated ringers infusion   Intravenous Continuous Romario Bingham MD 20 mL/hr at 09/17/24 0833 New Bag at 09/17/24 0833     No current [facility]-ordered outpatient medications on file.       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     Lab Results   Component Value Date    PGLU 135 08/06/2024          Vital Signs:  Body mass index is 36.73 kg/m².   height is 1.575 m (5' 2\") and weight is 91.1 kg (200 lb 12.8 oz). Her oral temperature is 98 °F (36.7 °C). Her blood pressure is 150/79 and her pulse is 79. Her respiration is 16 and oxygen saturation is 93%.   Vitals:    09/05/24 1135 09/17/24 0823   BP:  150/79   Pulse:  79   Resp:  16   Temp:  98 °F (36.7 °C)   TempSrc:  Oral   SpO2:  93%   Weight: 89.8 kg (198 lb) 91.1 kg (200 lb 12.8 oz)   Height: 1.575 m (5' 2\") 1.575 m (5' 2\")        Anesthesia Evaluation     Patient summary reviewed and Nursing notes reviewed    No history of anesthetic complications   Airway   Mallampati: II  TM distance: >3 FB  Neck ROM: full  Dental      Comment: Patient denies any additional loose, missing, and chipped teeth.    Pulmonary - negative ROS and normal exam    breath sounds clear to auscultation  (-) asthma, shortness of breath, recent URI, sleep apnea  Cardiovascular - negative ROS and normal exam  Exercise tolerance: good  (-) pacemaker, hypertension, valvular problems/murmurs, past MI, CAD, CABG/stent,  dysrhythmias, angina, CHF    Rhythm: regular  Rate: normal    Neuro/Psych - negative ROS   (-) seizures, TIA, CVA    GI/Hepatic/Renal - negative ROS   (-) GERD, liver disease, renal disease    Endo/Other    (-) diabetes mellitus, blood dyscrasia    Comments: obesity  Abdominal                  Anesthesia Plan:   ASA:  2  Plan:   General and MAC  Plan Comments: Artie block vs GA-LMA  Informed Consent Plan and Risks Discussed With:  Patient      I have informed Philomena Natashastacie Gutiérrez and/or legal guardian or family member of the nature of the anesthetic plan, benefits, risks including possible dental damage if relevant, major complications, and any alternative forms of anesthetic management.   All of the patient's questions were answered to the best of my ability. The patient desires the anesthetic management as planned.  Francesca Rangel MD  9/17/2024 8:49 AM  Present on Admission:  **None**

## 2024-09-17 NOTE — ANESTHESIA PROCEDURE NOTES
Peripheral IV  Date/Time: 9/17/2024 9:50 AM  Inserted by: Francesca Rangel MD    Placement  Needle size: 20 G  Laterality: left  Location: hand  Site prep: alcohol  Technique: anatomical landmarks  Attempts: 1

## 2024-09-17 NOTE — H&P
Pacemaker: No  Latex Allergy: no  Coumadin: No  Plavix: No  Other anticoagulants: No  Diet medication No  Cardiac stents: No     HAND DOMINANCE:      Right  Profession: LABOR AND DELIVERY RN     RECONSTRUCTIVE HISTORY     SUN EXPOSURE                Current no                Past       yes                Sunburns yes                Tanning salons current no                Tanning salons past no                Radiation treatments No      SKIN CANCER     Personal history of skin cancer: none     HAND                   Previous hand injury (personal)    No                 ++++++++++++++++++++++++++++++++++++++++++++++++++     ELECTRODIAGNOSTIC STUDIES  -   [facility]     DATE: 12/15/2022     RIGHT  Carpal Tunnel Syndrome: severe  Motor Latency: 8.18  Palmar Sensory Latency:  Sensory Antidromic: 5.05  APB Normal: no  Neurogenic Changes: no  Denervation Potentials: yes        LEFT  Carpal Tunnel Syndrome: mild  Motor Latency: 5.83  Palmar Sensory Latency:  Sensory Antidromic: 5.00  APB Normal: yes  Neurogenic Changes: no  Denervation Potentials: no        HPI:         58-year-old female right-hand-dominant with left carpal tunnel symptoms     2 years duration worsening     Constant numbness thumb, index, middle  \"Zinging\" pains intermittently     Awakened every other night with numbness and tingling     She drops things in the hand is weak     2 steroid injections with some relief but has recurrence symptoms              Review of Systems:   Constitutional: No change in appetite, chill/rigors, or fatigue  GI: No jaundice  Endocrine: No generalized weakness  Neurological: No aphasia, loss of consciousness, or seizures     Musculoskeletal:      Duration                          2 years     NUMBNESS / TINGLING                     LEFT                Constant  thumb  index finger  middle finger     Pain:                              Yes 2/10     Night symptoms:         Yes awakened every night with numbness and tingling      Functional problems: yes drops objects and weakness     Previous therapy:        yes    Pt had 2 steroid injections on the past  Wearing brace at night      PMH:      MEDICAL  Past Medical History       Past Medical History:    Carpal tunnel syndrome on right    Hair loss     seen by derm, taking nutrafol    Hypercholesterolemia    Obesity            SURGICAL  Past Surgical History         Past Surgical History:   Procedure Laterality Date    Other surgical history   1985     s/p left knee arthroscopy for torn meniscus    Other surgical history   1990     s/p laparoscopy ovarian cyst and adhesions    Tonsillectomy        [place] teeth removed        Wrist arthroscop,release xvers lig Right 06/27/2023     Right endoscopic carpal tunnel release            ALLERIGIES  Allergies        Allergies   Allergen Reactions    Amoxicillin RASH            MEDICATIONS  Current Medications          Current Outpatient Medications   Medication Sig Dispense Refill    Multiple Vitamin (MULTI-VITAMIN) Oral Tab Take 1 tablet by mouth daily.                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       The patient does live in a home with stairs.            FAMILY HISTORY  Family History         Family History   Problem Relation Age of Onset    Hypertension Mother      Other (atrial fibrillation) Mother      Hypertension Father      No Known Problems Sister      Hypertension Maternal Grandfather      Other  (alzheimer's) Paternal Grandmother      No Known Problems Son      No Known Problems Son      No Known Problems Daughter                 PHYSICAL EXAM:      CONSTITUTIONAL: Overall appearance - Normal  HEENT: Normocephalic  EYES: Conjunctiva - Right: Normal, Left: Normal; EOMI  EARS: Inspection - Right: Normal, Left: Normal  NECK/THYROID: Inspection - Normal, Palpation - Normal, Thyroid gland - Normal, No adenopathy  RESPIRATORY: Inspection - Normal, Effort - Normal  CARDIOVASCULAR: Regular rhythm, No murmurs  ABDOMEN: Inspection - Normal, No abdominal tenderness  NEURO: Memory intact  PSYCH: Oriented to person, place, time, and situation, Appropriate mood and affect           Hand Physical Exam:     ROM: bilateral full painless  Wrist Hyperextension: bilateral excellent  Thenar Intrinsics: bilateral intact/symmetric  Ulnar Intrinsics: bilateral intact/symmetric  Wrist Tenderness: bilateral none  Sensation: bilateral grossly intact     Median Nerve Compression: LMF     ASSESSMENT/PLAN:         CARPAL TUNNEL SYNDROME  left     DISPOSITION     We had a long discussion.  I explained to the patient what carpal tunnel syndrome is including treatment options.  The patient  may not have relief of symptoms with treatment.        This is a moderate carpal tunnel syndrome.  Refractory to steroid injections     Because of the severity (see Severity Index) of this carpal tunnel, non-operative treatment is not indicated. This requires surgery.  We discussed the procedure at length, including risks as indicated on the Surgical Request Form.  Questions were answered and the patient wishes to proceed with treatment.                Some symptoms may persist               Some symptoms may not improve               Some symptoms may worsen               Further surgery may be necessary     Even with surgery that decompresses the nerve, nerve function may not return.  After relief of pressure on the nerve, the nerve must recover.   This could take up to a year, but the nerve may not recover even after pressure is relieved, so symptoms may persist.     POST-OPERATIVE  PROTOCOL:  We discussed post-operative restrictions at length.  It is critical to maintain the dressing post-operatively and to comply with post-operative instructions.  The dressing must be carefully cared for, must remain dry, and cannot be removed under any circumstance.  Consistent elevation of the hand above heart level is critical.  Therapy will be necessary post-operatively.  Failure to comply with post-operative instructions, including therapy, will have significant impact on the final result, and could lead to permanent pain, stiffness, weakness, and loss of function.     Even with satisfactory healing, the hand/digit may not regain normal ROM or normal function.     PLAN     LECTR

## 2024-09-17 NOTE — INTERVAL H&P NOTE
Pre-op Diagnosis: Carpal tunnel syndrome on left [G56.02]    The above referenced H&P was reviewed by Romario Pagan MD on 9/17/2024, the patient was examined and no significant changes have occurred in the patient's condition since the H&P was performed.  I discussed with the patient and/or legal representative the potential benefits, risks and side effects of this procedure; the likelihood of the patient achieving goals; and potential problems that might occur during recuperation.  I discussed reasonable alternatives to the procedure, including risks, benefits and side effects related to the alternatives and risks related to not receiving this procedure.  We will proceed with procedure as planned.

## 2024-09-17 NOTE — BRIEF OP NOTE
Pre-Operative Diagnosis: Carpal tunnel syndrome on left [G56.02]     Post-Operative Diagnosis: Carpal tunnel syndrome on left [G56.02]      Procedure Performed:   Left endoscopic carpal tunnel release    Surgeons and Role:     * Romario Bingham MD - Primary    Assistant(s):        Surgical Findings: CTS     Specimen: Nonr     Estimated Blood Loss: 0 ml    Dictation Number:      Romario Pagan MD  9/17/2024  10:33 AM

## 2024-09-17 NOTE — ANESTHESIA POSTPROCEDURE EVALUATION
Patient: Philomena Gutiérrez    Procedure Summary       Date: 09/17/24 Room / Location: [facility] MAIN OR  / [facility] MAIN OR    Anesthesia Start: 0942 Anesthesia Stop:     Procedure: Left endoscopic carpal tunnel release (Left: Hand) Diagnosis:       Carpal tunnel syndrome on left      (Carpal tunnel syndrome on left [G56.02])    Surgeons: Romario Bingham MD Anesthesiologist: Francesca Rangel MD    Anesthesia Type: general, MAC ASA Status: 2            Anesthesia Type: general, MAC    Vitals Value Taken Time   /95 09/17/24 1037   Temp 96.8 09/17/24 1037   Pulse 81 09/17/24 1036   Resp 16 09/17/24 1037   SpO2 93 % 09/17/24 1036   Vitals shown include unfiled device data.    [facility] AN Post Evaluation:   Patient Evaluated in PACU  Patient Participation: complete - patient participated  Level of Consciousness: sleepy but conscious  Pain Score: 0  Pain Management: adequate  Airway Patency:patent  Dental exam unchanged from preop  Yes    Nausea/Vomiting: none  Cardiovascular Status: acceptable  Respiratory Status: acceptable  Postoperative Hydration acceptable      Francesca Rangel MD  9/17/2024 10:37 AM

## 2024-09-18 ENCOUNTER — TELEPHONE (OUTPATIENT)
Dept: SURGERY | Facility: CLINIC | Age: 59
End: 2024-09-18

## 2024-09-18 ENCOUNTER — OFFICE VISIT (OUTPATIENT)
Dept: SURGERY | Facility: CLINIC | Age: 59
End: 2024-09-18
Payer: COMMERCIAL

## 2024-09-18 DIAGNOSIS — M25.642 STIFFNESS OF JOINT, HAND, LEFT: ICD-10-CM

## 2024-09-18 DIAGNOSIS — M62.81 DISTAL MUSCLE WEAKNESS: Primary | ICD-10-CM

## 2024-09-18 NOTE — TELEPHONE ENCOUNTER
Spoke w/the pt and she states that she is having mild discomfort that is controled with over the counter analgesics , keeping hand elevated  Pt reminded to keep dressing clean and dry, keep arm in sling at all times.  Pt reminded of appointment with OT today, appointment with OT for suture removal on 10/1, and MD appt on 10/7  Pt verbalized an understanding.  Pt instructed to call the office w/any other questions and/or concerns.  Dr. Pagan notified.

## 2024-09-18 NOTE — TELEPHONE ENCOUNTER
[employer] [facility] [employer] forms received and logged for processing, sent mychart for letha and fcr

## 2024-09-18 NOTE — OPERATIVE REPORT
[facility]    PATIENT'S NAME: KELLY ARANGO   ATTENDING PHYSICIAN: Romario Pagan MD   OPERATING PHYSICIAN: Romario Pagan MD   PATIENT ACCOUNT#:   [id number]    LOCATION:  [facility] 10  MEDICAL RECORD #:   [id number]       YOB: 1965  ADMISSION DATE:       09/17/2024      OPERATION DATE:  09/17/2024    OPERATIVE REPORT      PREOPERATIVE DIAGNOSIS:  Left carpal tunnel syndrome.  POSTOPERATIVE DIAGNOSIS:  Left carpal tunnel syndrome.  PROCEDURE:  Left endoscopic carpal tunnel release.    INDICATIONS:  A 59-year-old female, right-hand dominant, with a 2-year history of worsening carpal tunnel symptoms.  She now has constant numbness in the thumb, index, middle.  She suffers \"zings\" intermittently.  She has night symptoms.  The hand is weak, and she drops things.  She has had 2 steroid injections with relief, but now with recurrence.  She is admitted to the operating amphitheater for left endoscopic carpal tunnel release.  She had a right endoscopic carpal tunnel release in 2023 with complete relief of symptoms.    FINDINGS:  The left hand has full painless range of motion with excellent wrist hyperextension.  Thenar intrinsics are intact and symmetric.  Tinel's is positive to the left middle finger.  Electrodiagnostic studies reveal a left carpal tunnel syndrome.      OPERATIVE TECHNIQUE:  A Artie block was placed.  The hand and forearm were prepped and draped in the usual sterile fashion.     Proximal and distal incisions were made.  The proximal incision was extended to the antebrachial fascia, which was incised longitudinally 3 cm proximal to the proximal incision.  A curved dissector was placed deep to the antebrachial fascia and in continuity with the transverse carpal ligament.  We peeled flexor tenosynovium off the transverse carpal ligament.  The slotted cannula and endoscope were placed.  We had excellent visualization of the dorsal surface of the  transverse carpal ligament.  Complete release of the transverse carpal ligament was accomplished with a push knife, a triangle knife, and a pull knife.  We had excellent herniation of the palmar fat into the cannula.  The cannula and endoscope were withdrawn.  A curved dissector was used to document complete release of the fascia proximally and the transverse carpal ligament distally.  Skin edges were reapproximated with 4-0 nylon.  A soft dressing was placed.    The tourniquet was released.  Total tourniquet time 36 minutes.    The patient tolerated the procedure well and left the operating suite in satisfactory condition.     Dictated By Romario Pagan MD  d: 09/17/2024 10:35:51  t: 09/17/2024 19:25:21  [facility] [id number]  [facility]/    cc: MD Zulay Luevano DO Yogen Y. Patel, DO

## 2024-09-18 NOTE — PROGRESS NOTES
Carpal Tunnel Post - Op Note:    Subjective: My left hand is more swollen than my right was.      Objective:  Occupational Therapy completed the following educational areas status post elective carpal tunnel procedure:    1)  Dressing was removed and handwashing technique was reviewed using anti bacterial soap.    2)  Dressing was changed using x 2 cloth band aids and polysporin.    3)  Home exercise program reviewed and written handout provided for further reference:     A)   Tendon gliding exercises, x 10 reps per set, x 8 sets per day.     B)   AROM:   Full fisting exercises, x 20 reps per set, x 5 sets per day.     C)   Wrist flexion and extension exercises, x 20 reps per set, x 5 sets per day.    Note:  Instructed patient that exercises should not be painful.      Assessment: Patient verbalized and demonstrated independence with prescribed home exercise program and dressing change technique.    Plan: Patient will return to the clinic for suture removal as indicated by protocol.      Peterson Borges  OTR/L

## 2024-09-23 ENCOUNTER — HOSPITAL ENCOUNTER (OUTPATIENT)
Dept: CV DIAGNOSTICS | Facility: HOSPITAL | Age: 59
Discharge: HOME | End: 2024-09-23
Attending: INTERNAL MEDICINE
Payer: COMMERCIAL

## 2024-09-23 ENCOUNTER — HOSPITAL ENCOUNTER (OUTPATIENT)
Dept: ULTRASOUND IMAGING | Age: 59
Discharge: HOME | End: 2024-09-23
Attending: INTERNAL MEDICINE
Payer: COMMERCIAL

## 2024-09-23 ENCOUNTER — HOSPITAL ENCOUNTER (OUTPATIENT)
Dept: CT IMAGING | Age: 59
Discharge: HOME | End: 2024-09-23
Attending: INTERNAL MEDICINE
Payer: COMMERCIAL

## 2024-09-23 DIAGNOSIS — R10.11 RUQ PAIN: ICD-10-CM

## 2024-09-23 DIAGNOSIS — R91.1 LUNG NODULE: ICD-10-CM

## 2024-09-23 DIAGNOSIS — I28.8 DILATED PULMONARY TRUNK (HCC): ICD-10-CM

## 2024-09-23 PROCEDURE — 93306 TTE W/DOPPLER COMPLETE: CPT | Performed by: INTERNAL MEDICINE

## 2024-09-23 PROCEDURE — 71250 CT THORAX DX C-: CPT | Performed by: INTERNAL MEDICINE

## 2024-09-23 PROCEDURE — 76705 ECHO EXAM OF ABDOMEN: CPT | Performed by: INTERNAL MEDICINE

## 2024-09-24 NOTE — TELEPHONE ENCOUNTER
Dr. Pagan,    Please sign off on Disability due to Left endoscopic carpal tunnel release 9/17/24 to 6 weeks(10/29/24) additional form.    -Signature page will be the first page scanned  -From your Inbasket, Highlight the patient and click Chart   -Double click the 09/11/24 Forms Completion telephone encounter  -Scroll down to the Media section   -Click the blue Hyperlink: Disability Dr Pagan 9/24/24  -Click Acknowledge located in the top right ribbon/menu   -Drag the mouse into the blank space of the document and a + sign will appear. Left click to   electronically sign the document.  -Once signed, simply exit out of the screen and you signature will be saved.     Thank you for your time!      Afua

## 2024-09-26 NOTE — TELEPHONE ENCOUNTER
Form completed and faxed to [employer] [facility], [phone], confirmation received.  MyChart sent to patient.

## 2024-09-30 ENCOUNTER — TELEPHONE (OUTPATIENT)
Dept: INTERNAL MEDICINE CLINIC | Facility: CLINIC | Age: 59
End: 2024-09-30

## 2024-10-01 ENCOUNTER — APPOINTMENT (OUTPATIENT)
Dept: SURGERY | Facility: CLINIC | Age: 59
End: 2024-10-01
Payer: COMMERCIAL

## 2024-10-01 DIAGNOSIS — M25.642 STIFFNESS OF JOINT, HAND, LEFT: ICD-10-CM

## 2024-10-01 DIAGNOSIS — M62.81 DISTAL MUSCLE WEAKNESS: Primary | ICD-10-CM

## 2024-10-01 PROCEDURE — 97110 THERAPEUTIC EXERCISES: CPT | Performed by: OCCUPATIONAL THERAPIST

## 2024-10-01 PROCEDURE — 97165 OT EVAL LOW COMPLEX 30 MIN: CPT | Performed by: OCCUPATIONAL THERAPIST

## 2024-10-02 NOTE — PROGRESS NOTES
OCCUPATIONAL THERAPY EVALUATION:   Philomena Gutiérrez   [id number]       SUBJECTIVE:    HX of Injury: Left hand pain and numbness.  Chief Complaint:  Left wrist soreness.    Precautions:  2 # lifting restriction with the left hand.  Premorbid Functional Status: Independent w/ Occ. duties, Independent w/ driving / sitting, Independent w/ ADL's  Current Level of Function: 2 # lifting restriction with the left hand  Employment: Temporary leave  Hand Dominance: right  Living Situation: Family  Barriers to Learning: None  Patient Goals: Full use of the left hand    Imaging/Tests: EMG        OBJECTIVE DATA:   PAIN:   Rating (1/10): 1/10 at rest, 2/10 with activity  Location:       SCAR/INCISION: Flat, Non-adhered, and Flexible    SENSORY:  Numbness has subsided.      AROM/PROM:  (Degrees)  LEFT HAND:    Thumb IF MF RF SF   MP Full left hand composite fist       PIP        DIP        CENTENO                  STRENGTH: (lbs) Right Average Left Average   : NT NT   2 pt Pinch:     3 pt Pinch:     Lateral Pinch:         ASSESSMENT & PLAN OF CARE:    Treatment Provided: Patient was seen for an initial evaluation, hand washing: Sutures were removed per order Reviewed coldcream scar massage techique  HEP:  AROM, Tendon glides, x 20 reps per set, x 5 sets daily. Reviewed hand elevation importance. Written handout was provided to reinforce today's treatment and educational session.       Rehabilitation Potential: Good    CLINICAL ASSESSMENT:    Patient/Caregiver Education Provided: Yes    Treatment Plan:  Therapeutic Exercise  Therapeutic Activities  Scar Management  Patient/Family Education    GOALS:  Short term goals to be reached in x 1 week:    1) Independent with HEP..    Long term goals to be reached in x 2 - 3 weeks:    1) Full functional use of the involved extremity for self-care, leisure and work related tasks:.        Patient will be seen 1 x /week for 3 weeks or a total of 3 visits.   Pt. was advised regarding the  findings of this evaluation and agrees to the plan of care.     Peterson RODRIGUEZ, OTRL

## 2024-10-07 ENCOUNTER — OFFICE VISIT (OUTPATIENT)
Dept: SURGERY | Facility: CLINIC | Age: 59
End: 2024-10-07
Payer: COMMERCIAL

## 2024-10-07 ENCOUNTER — OFFICE VISIT (OUTPATIENT)
Dept: SURGERY | Facility: CLINIC | Age: 59
End: 2024-10-07

## 2024-10-07 DIAGNOSIS — M62.81 DISTAL MUSCLE WEAKNESS: Primary | ICD-10-CM

## 2024-10-07 DIAGNOSIS — M25.642 STIFFNESS OF JOINT, HAND, LEFT: ICD-10-CM

## 2024-10-07 DIAGNOSIS — G56.02 CARPAL TUNNEL SYNDROME ON LEFT: Primary | ICD-10-CM

## 2024-10-07 PROCEDURE — 97110 THERAPEUTIC EXERCISES: CPT | Performed by: OCCUPATIONAL THERAPIST

## 2024-10-07 PROCEDURE — 99024 POSTOP FOLLOW-UP VISIT: CPT | Performed by: PLASTIC SURGERY

## 2024-10-07 RX ORDER — MINOXIDIL 2.5 MG/1
TABLET ORAL
COMMUNITY
Start: 2024-09-05

## 2024-10-07 NOTE — PROGRESS NOTES
Subjective: My left hand is weak.      Objective:     Current level of performance:  ADL: Independent  Work: On leave   Leisure: Family    Measurements/Tests:  ROM:  Testing By: stuart   Strength Right: 40 #      Strength Left: 5 #      Treatment Provided this day: Up dated bilateral hand strength measurements:  Physician follow-up. Reviewed HEP: as previously documented.    Treatment Time: 15 minutes      Summary/Analysis of Treatment session: Patient could benefit from additional OT intervention, prior to returning to full work duty status.      Plan: To return to full work duty status 10/21/2024      Follow up in:  x 1 week.          Peterson Mcdowell  OTR/L

## 2024-10-07 NOTE — PROGRESS NOTES
Injury 1: R wrist - TFCC sprain  - Date: 07/17/21  - Days Since: 1178     Surgery 1: RECTR  - Date: 06/27/23  - Days Since: 468    Surgery 2: LECTR  - Date: 09/17/24  - Days Since: 20  Intermittent sharp pain with grabbing. Rates pain 7/10.  Not taking analgesics. For pain.  Numbness and tingling have resolved.  Scars healing well without s/s of infection, skin peeling present.  Continues eucerin massage    20 days postop.  No complaints.  No pain.  Numbness is gone    Full range of motion  Scars are healing well  Strength 5 versus 40  Fullness and tenderness over the carpal tunnel area      Continue OT:  Strengthening  Ultrasound    Back to work regular duty 2 weeks, 10/21

## 2024-10-10 ENCOUNTER — OFFICE VISIT (OUTPATIENT)
Dept: SURGERY | Facility: CLINIC | Age: 59
End: 2024-10-10
Payer: COMMERCIAL

## 2024-10-10 DIAGNOSIS — M25.642 STIFFNESS OF JOINT, HAND, LEFT: ICD-10-CM

## 2024-10-10 DIAGNOSIS — M62.81 DISTAL MUSCLE WEAKNESS: Primary | ICD-10-CM

## 2024-10-10 PROCEDURE — 97035 APP MDLTY 1+ULTRASOUND EA 15: CPT | Performed by: OCCUPATIONAL THERAPIST

## 2024-10-10 PROCEDURE — 97022 WHIRLPOOL THERAPY: CPT | Performed by: OCCUPATIONAL THERAPIST

## 2024-10-10 NOTE — PROGRESS NOTES
Subjective: My left hand feels a little better.      Objective:     Current level of performance:  ADL: Independent  Work: On leave  Leisure: Family    Measurements/Tests:  ROM:      N/A            Treatment Provided this day: Fluidotherapy to the left wrist and hand: To increase active range of motion, reduce joint stiffness, as well as decrease scar sensitivity, for increased functional use of the involved extremity, during self care, work and or leisure time tasks. Ultrasound to the left hand palmar surface scar sites: 8 min. Continuous 3.3 mhz w/cm squared. To reduce edema, increase circulation, soften scar tissue, for increased range of motion and reduction of pain. HEP:    Treatment Time: 30 minutes      Summary/Analysis of Treatment session: Tolerated this days therapeutic modality regime well.      Plan: Full use of the left hand in x 2 - 3 weeks.      Follow up in:  x 1 week.          Peterson Mcdowell OTR/L

## 2024-10-17 ENCOUNTER — OFFICE VISIT (OUTPATIENT)
Dept: SURGERY | Facility: CLINIC | Age: 59
End: 2024-10-17
Payer: COMMERCIAL

## 2024-10-17 DIAGNOSIS — M25.642 STIFFNESS OF JOINT, HAND, LEFT: ICD-10-CM

## 2024-10-17 DIAGNOSIS — M62.81 DISTAL MUSCLE WEAKNESS: Primary | ICD-10-CM

## 2024-10-17 PROCEDURE — 97035 APP MDLTY 1+ULTRASOUND EA 15: CPT | Performed by: OCCUPATIONAL THERAPIST

## 2024-10-17 PROCEDURE — 97022 WHIRLPOOL THERAPY: CPT | Performed by: OCCUPATIONAL THERAPIST

## 2024-10-17 NOTE — PROGRESS NOTES
Subjective: My left hand is feeling better.      Objective:     Current level of performance:  ADL: Independent  Work: On leave  Leisure: Family    Measurements/Tests:  ROM:         N/A         Treatment Provided this day: Fluidotherapy to the left wrist and hand: To increase active range of motion, reduce joint stiffness, as well as decrease scar sensitivity, for increased functional use of the involved extremity, during self care, work and or leisure time tasks.Ultrasound to the left wrist and hand / palmar surface scar sites: 8 min. Continuous 3.3 mhz w/cm squared. To reduce edema, increase circulation, soften scar tissue, for increased range of motion and reduction of pain. HEP.    Treatment Time: 30 minutes      Summary/Analysis of Treatment session: Patient tolerated this days therapeutic modality regime well.      Plan: To return to full unrestricted work activity in x 2 weeks.      Follow up in:  10/22/2024          Peterson DANIELS/CHERYL

## 2024-10-23 ENCOUNTER — OFFICE VISIT (OUTPATIENT)
Dept: SURGERY | Facility: CLINIC | Age: 59
End: 2024-10-23
Payer: COMMERCIAL

## 2024-10-23 DIAGNOSIS — M62.81 DISTAL MUSCLE WEAKNESS: Primary | ICD-10-CM

## 2024-10-23 DIAGNOSIS — M25.642 STIFFNESS OF JOINT, HAND, LEFT: ICD-10-CM

## 2024-10-23 PROCEDURE — 97035 APP MDLTY 1+ULTRASOUND EA 15: CPT | Performed by: OCCUPATIONAL THERAPIST

## 2024-10-23 PROCEDURE — 97018 PARAFFIN BATH THERAPY: CPT | Performed by: OCCUPATIONAL THERAPIST

## 2024-10-23 NOTE — PROGRESS NOTES
Subjective: My left wrist is still sore.      Objective:     Current level of performance:  ADL: Independent  Work: On leave  Leisure: Family    Measurements/Tests:  ROM:         N/A         Treatment Provided this day: Paraffin bath to the left hand: Paraffin bath x 10 minutes to the involved hand, to increase joint mobility, for increased ease of motion, as well as reduction of pain, so as to increase the benefit of therapeutic exercises and strengthening. Ultrasound to the left wrist and hand scar sites:  8 min. Continuous 3.3 mhz w/cm squared. To reduce edema, increase circulation, soften scar tissue, for increased range of motion and reduction of pain. HEP:    Treatment Time: 30 minutes      Summary/Analysis of Treatment session: Patient tolerated this days therapeutic modality regime well.      Plan: Full use of the left hand in x 2 weeks:      Follow up in:  10/29/2024.          Peterson DANIELS/CHERYL

## 2024-10-29 ENCOUNTER — OFFICE VISIT (OUTPATIENT)
Dept: SURGERY | Facility: CLINIC | Age: 59
End: 2024-10-29
Payer: COMMERCIAL

## 2024-10-29 DIAGNOSIS — M25.642 STIFFNESS OF JOINT, HAND, LEFT: ICD-10-CM

## 2024-10-29 DIAGNOSIS — M62.81 DISTAL MUSCLE WEAKNESS: Primary | ICD-10-CM

## 2024-10-29 PROCEDURE — 97022 WHIRLPOOL THERAPY: CPT | Performed by: OCCUPATIONAL THERAPIST

## 2024-10-29 PROCEDURE — 97035 APP MDLTY 1+ULTRASOUND EA 15: CPT | Performed by: OCCUPATIONAL THERAPIST

## 2024-10-29 NOTE — PROGRESS NOTES
Subjective: There is one spot on my left palm that is still sore to deep pressure.      Objective:     Current level of performance:  ADL: Independent  Work: On leave  Leisure: Family    Measurements/Tests:  ROM:            N/A      Treatment Provided this day: Fluidotherapy to the left wrist and hand: To increase active range of motion, reduce joint stiffness, as well as decrease scar sensitivity, for increased functional use of the involved extremity, during self care, work and or leisure time tasks. Ultrasound to the left hand / palmar surface scar sites: 8 min. Continuous 3.3 mhz w/cm squared. To reduce edema, increase circulation, soften scar tissue, for increased range of motion and reduction of pain. HEP.    Treatment Time: 30 minutes      Summary/Analysis of Treatment session: Progressing with OT goals and objectives, however continues to have complaints of left hand palmar surface discomfort during functional related task needs.      Plan: Full use of the left hand by 11/11/2024.      Follow up in:  11/04/2024          Peterson Mcdowell OTR/CHERYL

## 2024-11-04 ENCOUNTER — OFFICE VISIT (OUTPATIENT)
Dept: SURGERY | Facility: CLINIC | Age: 59
End: 2024-11-04
Payer: COMMERCIAL

## 2024-11-04 ENCOUNTER — OFFICE VISIT (OUTPATIENT)
Dept: SURGERY | Facility: CLINIC | Age: 59
End: 2024-11-04

## 2024-11-04 DIAGNOSIS — M79.602 PAIN OF LEFT UPPER EXTREMITY: Primary | ICD-10-CM

## 2024-11-04 DIAGNOSIS — G56.02 CARPAL TUNNEL SYNDROME ON LEFT: Primary | ICD-10-CM

## 2024-11-04 DIAGNOSIS — M25.642 STIFFNESS OF JOINT, HAND, LEFT: ICD-10-CM

## 2024-11-04 DIAGNOSIS — M62.81 DISTAL MUSCLE WEAKNESS: ICD-10-CM

## 2024-11-04 PROCEDURE — 97110 THERAPEUTIC EXERCISES: CPT | Performed by: OCCUPATIONAL THERAPIST

## 2024-11-04 PROCEDURE — 99024 POSTOP FOLLOW-UP VISIT: CPT | Performed by: PLASTIC SURGERY

## 2024-11-04 RX ORDER — METHYLPREDNISOLONE 4 MG/1
TABLET ORAL
Qty: 1 EACH | Refills: 0 | Status: SHIPPED | OUTPATIENT
Start: 2024-11-04

## 2024-11-04 NOTE — PROGRESS NOTES
Subjective: I still have some discomfort on the palm of my left hand.      Objective:     Current level of performance:  ADL: Independent  Work: On leave  Leisure: Family    Measurements/Tests:  ROM:      N/A            Treatment Provided this day: Reviewed HEP: Physician follow-up.  AROM:   X 20 reps per set, x 5 sets daily:    Tendon glides:  X 10 reps per set, x 8 sets daily:    PROM:  To all digits:  X 15 reps each digit per set, x 5 sets daily    Treatment Time: 20 minutes      Summary/Analysis of Treatment session: Patient continues to have left hand pain in her palm.      Plan: A medrol pack was prescribed. Patient to continue with her HEP.      Follow up in:  11/11/2024          Peterson Mcdowell OTR/CHERYL

## 2024-11-04 NOTE — PROGRESS NOTES
Surgery 1: RECTR  - Date: 06/27/23  - Days Since: 496    Surgery 2: LECTR  - Date: 09/17/24  - Days Since: 48      48 days postop  Some pain mostly ulnar pillar, persistent with ultrasound    Numbness is gone    Full range of motion  Scar is healing well    Strength 30 versus 70    Continue desensitization and ultrasound  Medrol Dosepak    Continue OT    Back to regular work 2 weeks, 11/8 seen

## 2024-11-11 ENCOUNTER — OFFICE VISIT (OUTPATIENT)
Dept: SURGERY | Facility: CLINIC | Age: 59
End: 2024-11-11
Payer: COMMERCIAL

## 2024-11-11 DIAGNOSIS — M62.81 DISTAL MUSCLE WEAKNESS: Primary | ICD-10-CM

## 2024-11-11 DIAGNOSIS — M25.642 STIFFNESS OF JOINT, HAND, LEFT: ICD-10-CM

## 2024-11-11 PROCEDURE — 97110 THERAPEUTIC EXERCISES: CPT | Performed by: OCCUPATIONAL THERAPIST

## 2024-11-11 NOTE — PROGRESS NOTES
Subjective: I am ready to return to work.      Objective:     Current level of performance:  ADL: Independent  Work: Returning to full work duty status  Leisure: Family    Measurements/Tests:  ROM:  Testing By: stuart   Strength Right: 50 #      Strength Left: 40 #      Treatment Provided this day: Up dated bilateral hand strength measurements: Reviewed HEP.    Treatment Time: 20 minutes      Summary/Analysis of Treatment session: No further OT needs at this time.      Plan: Discontinue OT      Follow up in:  To call with questions and or concerns.          Peterson Mcdowell  OTR/L

## 2024-12-05 ENCOUNTER — HOSPITAL ENCOUNTER (OUTPATIENT)
Dept: CT IMAGING | Age: 59
Discharge: HOME | End: 2024-12-05
Attending: OBSTETRICS & GYNECOLOGY

## 2024-12-05 DIAGNOSIS — Z12.31 ENCOUNTER FOR SCREENING MAMMOGRAM FOR MALIGNANT NEOPLASM OF BREAST: ICD-10-CM

## 2024-12-05 PROCEDURE — 77067 SCR MAMMO BI INCL CAD: CPT

## 2025-08-09 ENCOUNTER — OFFICE VISIT (OUTPATIENT)
Age: 60
End: 2025-08-09

## 2025-08-09 VITALS
OXYGEN SATURATION: 97 % | DIASTOLIC BLOOD PRESSURE: 70 MMHG | BODY MASS INDEX: 36.62 KG/M2 | HEIGHT: 62 IN | HEART RATE: 97 BPM | WEIGHT: 199 LBS | SYSTOLIC BLOOD PRESSURE: 118 MMHG

## 2025-08-09 DIAGNOSIS — Z00.00 PREVENTATIVE HEALTH CARE: Primary | ICD-10-CM

## 2025-08-09 DIAGNOSIS — R91.1 LUNG NODULE: ICD-10-CM

## 2025-08-22 ENCOUNTER — LAB ENCOUNTER (OUTPATIENT)
Dept: LAB | Age: 60
End: 2025-08-22
Attending: INTERNAL MEDICINE

## 2025-08-22 DIAGNOSIS — Z00.00 PREVENTATIVE HEALTH CARE: ICD-10-CM

## 2025-08-22 LAB
ALBUMIN SERPL-MCNC: 4.6 G/DL (ref 3.2–4.8)
ALBUMIN/GLOB SERPL: 1.8 (ref 1–2)
ALP LIVER SERPL-CCNC: 83 U/L (ref 46–118)
ALT SERPL-CCNC: 25 U/L (ref 10–49)
ANION GAP SERPL CALC-SCNC: 8 MMOL/L (ref 0–18)
AST SERPL-CCNC: 22 U/L (ref ?–34)
BASOPHILS # BLD AUTO: 0.03 X10(3) UL (ref 0–0.2)
BASOPHILS NFR BLD AUTO: 0.5 %
BILIRUB SERPL-MCNC: 1.6 MG/DL (ref 0.3–1.2)
BUN BLD-MCNC: 12 MG/DL (ref 9–23)
BUN/CREAT SERPL: 12.2 (ref 10–20)
CALCIUM BLD-MCNC: 9.4 MG/DL (ref 8.7–10.4)
CHLORIDE SERPL-SCNC: 104 MMOL/L (ref 98–112)
CHOLEST SERPL-MCNC: 258 MG/DL (ref ?–200)
CO2 SERPL-SCNC: 28 MMOL/L (ref 21–32)
CREAT BLD-MCNC: 0.98 MG/DL (ref 0.55–1.02)
DEPRECATED RDW RBC AUTO: 41 FL (ref 35.1–46.3)
EGFRCR SERPLBLD CKD-EPI 2021: 66 ML/MIN/1.73M2 (ref 60–?)
EOSINOPHIL # BLD AUTO: 0.06 X10(3) UL (ref 0–0.7)
EOSINOPHIL NFR BLD AUTO: 0.9 %
ERYTHROCYTE [DISTWIDTH] IN BLOOD BY AUTOMATED COUNT: 13.4 % (ref 11–15)
FASTING PATIENT LIPID ANSWER: YES
FASTING STATUS PATIENT QL REPORTED: YES
GLOBULIN PLAS-MCNC: 2.6 G/DL (ref 2–3.5)
GLUCOSE BLD-MCNC: 102 MG/DL (ref 70–99)
HCT VFR BLD AUTO: 40 % (ref 35–48)
HDLC SERPL-MCNC: 56 MG/DL (ref 40–59)
HGB BLD-MCNC: 14.5 G/DL (ref 12–16)
IMM GRANULOCYTES # BLD AUTO: 0.02 X10(3) UL (ref 0–1)
IMM GRANULOCYTES NFR BLD: 0.3 %
LDLC SERPL CALC-MCNC: 180 MG/DL (ref ?–100)
LYMPHOCYTES # BLD AUTO: 1.83 X10(3) UL (ref 1–4)
LYMPHOCYTES NFR BLD AUTO: 28.1 %
MCH RBC QN AUTO: 32.7 PG (ref 26–34)
MCHC RBC AUTO-ENTMCNC: 36.3 G/DL (ref 31–37)
MCV RBC AUTO: 90.3 FL (ref 80–100)
MONOCYTES # BLD AUTO: 0.45 X10(3) UL (ref 0.1–1)
MONOCYTES NFR BLD AUTO: 6.9 %
NEUTROPHILS # BLD AUTO: 4.12 X10 (3) UL (ref 1.5–7.7)
NEUTROPHILS # BLD AUTO: 4.12 X10(3) UL (ref 1.5–7.7)
NEUTROPHILS NFR BLD AUTO: 63.3 %
NONHDLC SERPL-MCNC: 202 MG/DL (ref ?–130)
OSMOLALITY SERPL CALC.SUM OF ELEC: 290 MOSM/KG (ref 275–295)
PLATELET # BLD AUTO: 198 10(3)UL (ref 150–450)
POTASSIUM SERPL-SCNC: 4.2 MMOL/L (ref 3.5–5.1)
PROT SERPL-MCNC: 7.2 G/DL (ref 5.7–8.2)
RBC # BLD AUTO: 4.43 X10(6)UL (ref 3.8–5.3)
SODIUM SERPL-SCNC: 140 MMOL/L (ref 136–145)
TRIGL SERPL-MCNC: 125 MG/DL (ref 30–149)
TSI SER-ACNC: 1.26 UIU/ML (ref 0.55–4.78)
VLDLC SERPL CALC-MCNC: 25 MG/DL (ref 0–30)
WBC # BLD AUTO: 6.5 X10(3) UL (ref 4–11)

## 2025-08-22 PROCEDURE — 84443 ASSAY THYROID STIM HORMONE: CPT

## 2025-08-22 PROCEDURE — 80061 LIPID PANEL: CPT

## 2025-08-22 PROCEDURE — 85025 COMPLETE CBC W/AUTO DIFF WBC: CPT

## 2025-08-22 PROCEDURE — 36415 COLL VENOUS BLD VENIPUNCTURE: CPT

## 2025-08-22 PROCEDURE — 80053 COMPREHEN METABOLIC PANEL: CPT

## 2025-08-24 ENCOUNTER — RESULTS FOLLOW-UP (OUTPATIENT)
Age: 60
End: 2025-08-24

## 2025-08-30 ENCOUNTER — HOSPITAL ENCOUNTER (OUTPATIENT)
Dept: CT IMAGING | Age: 60
Discharge: HOME | End: 2025-08-30
Attending: INTERNAL MEDICINE

## 2025-08-30 DIAGNOSIS — R91.1 LUNG NODULE: ICD-10-CM

## 2025-08-30 PROCEDURE — 71250 CT THORAX DX C-: CPT | Performed by: INTERNAL MEDICINE

## (undated) DEVICE — GAMMEX® PI HYBRID SIZE 7, STERILE POWDER-FREE SURGICAL GLOVE, POLYISOPRENE AND NEOPRENE BLEND: Brand: GAMMEX

## (undated) DEVICE — PLASTIC HAND: Brand: MEDLINE INDUSTRIES, INC.

## (undated) DEVICE — BANDAGE ADH 7/8IN NAT FLX-FAB WVN FOR WND

## (undated) DEVICE — BANDAGE CURAD FABRIC 7/8IN

## (undated) DEVICE — APPLICATOR STD 6IN COT TIP WOOD HNDL ST

## (undated) DEVICE — SOLUTION IRRIG 1000ML 0.9% NACL USP BTL

## (undated) DEVICE — SOL NACL IRRIG 0.9% 1000ML BTL

## (undated) DEVICE — SUT ETHILON 4-0 P-3 699G

## (undated) DEVICE — ECTRA II PROCEDURE KIT,                                    SINGLE-USE, DISPOSABLE. CONTENTS                                    PROBE KNIFE, RETROGRADE KNIFE,                                    TRIANGLE KNIFE, HAND PAD, SWABS: Brand: ECTRA

## (undated) DEVICE — SUT ETHLN 4-0 18IN P-3 NABSRB BLK 13MM 3/8 CI

## (undated) DEVICE — PACK CDS PLASTIC HAND

## (undated) DEVICE — GLOVE SUR 7 SENSICARE PI MIC PIP CRM PWD F

## (undated) NOTE — LETTER
AUTHORIZATION FOR SURGICAL OPERATION OR OTHER PROCEDURE    1.  I hereby authorize Dr. Padmini Samayoa and the [facility] staff assigned to my case to perform the following operation and/or procedure at the [facility]:    Ultrasound guided right carpal tunnel injectio ______/______/_____                    Time:  ________ A. M.  P.M.        Patient Name:  ____Gina M Beyer__________________________________________________  (please print)       Patient signature:  ___________________________________________________

## (undated) NOTE — LETTER
24      Patient: Philomena Gutiérrez  : 1965 Visit date: 2024    Dear Yvette,      I examined your patient in consultation today.    She indicated that she was told that I am retiring soon.  FYI: Nothing could be further from the truth.  I have still practicing plastic surgery and hand surgery as I have been, and plan on for several years to come.  Please let your colleagues know that I am still available to take care of your patients.    She has left carpal tunnel syndrome with recurrent symptoms post to steroid injections.  We will plan on left endoscopic carpal tunnel release.    Thank you for your kind referral. If I may answer any questions, please feel free to contact me.     Sincerely,   Romario Pagan MD     CC:   No Recipients

## (undated) NOTE — LETTER
AUTHORIZATION FOR SURGICAL OPERATION OR OTHER PROCEDURE    1. I hereby authorize Dr. Yvette Olivera and the [facility] staff assigned to my case to perform the following operation and/or procedure at the [facility]:    Ultrasound guided left carpal tunnel injection with corticosteroid     2.  My physician has explained the nature and purpose of the operation or other procedure, possible alternative methods of treatment, the risks involved, and the possibility of complication to me.  I acknowledge that no guarantee has been made as to the result that may be obtained.  3.  I recognize that, during the course of this operation, or other procedure, unforseen conditions may necessitate additional or different procedure than those listed above.  I, therefore, further authorize and request that the above named physician, his/her physician assistants or designees perform such procedures as are, in his/her professional opinion, necessary and desirable.  4.  Any tissue or organs removed in the operation or other procedure may be disposed of by and at the discretion of the [facility] staff and [facility].  5.  I understand that in the event of a medical emergency, I will be transported by local paramedics to [facility] or other hospital emergency department.  6.  I certify that I have read and fully understand the above consent to operation and/or other procedure.    7.  I acknowledge that my physician has explained sedation/analgesia administration to me including the risks and benefits.  I consent to the administration of sedation/analgesia as may be necessary or desirable in the judgement of my physician.    Witness signature: ___________________________________________________ Date:  ______/______/_____                    Time:  ________ A.M.  P.M.       Patient Name:   Philomena Gutiérrez  6/22/1965  [id number]         Patient signature:   ___________________________________________________                 Statement of Physician  My signature below affirms that prior to the time of the procedure, I have explained to the patient and/or his/her guardian, the risks and benefits involved in the proposed treatment and any reasonable alternative to the proposed treatment.  I have also explained the risks and benefits involved in the refusal of the proposed treatment and have answered the patient's questions.                        Date:  ______/______/_______  Provider                      Signature:  __________________________________________________________       Time:  ___________ AJOHAN COLINDRES

## (undated) NOTE — LETTER
24      Patient: Philomena Gutiérrez  : 1965 Visit date: 2024    Dear [employer], welcome to the organization!  It is a great group to work with.  I look forward to meeting you in the near future.    I examined your patient in consultation today.    She has left carpal tunnel syndrome recurrent post steroid injection.  We will plan on left endoscopic carpal tunnel release.    If I may answer any questions, please feel free to contact me.     Sincerely,   Romario Pagan MD     CC:   No Recipients

## (undated) NOTE — LETTER
AUTHORIZATION FOR SURGICAL OPERATION OR OTHER PROCEDURE    1. I hereby authorize Dr. Radha Aguirre and the [facility] staff assigned to my case to perform the following operation and/or procedure at the [facility]:    Bilateral carpal tunnel injection   _______________________________________________________________________________________________      _______________________________________________________________________________________________    2. My physician has explained the nature and purpose of the operation or other procedure, possible alternative methods of treatment, the risks involved, and the possibility of complication to me. I acknowledge that no guarantee has been made as to the result that may be obtained. 3.  I recognize that, during the course of this operation, or other procedure, unforseen conditions may necessitate additional or different procedure than those listed above. I, therefore, further authorize and request that the above named physician, his/her physician assistants or designees perform such procedures as are, in his/her professional opinion, necessary and desirable. 4.  Any tissue or organs removed in the operation or other procedure may be disposed of by and at the discretion of the [facility] staff and [facility]. 5.  I understand that in the event of a medical emergency, I will be transported by local paramedics to [facility] or other [facility] emergency department. 6.  I certify that I have read and fully understand the above consent to operation and/or other procedure. 7.  I acknowledge that my physician has explained sedation/analgesia administration to me including the risks and benefits. I consent to the administration of sedation/analgesia as may be necessary or desirable in the judgement of my physician.     Witness signature: ___________________________________________________ Date:  ______/______/_____                    Time: ________ A. M.  P.M. Patient Name:  Ankit RODRIGUEZ Beyer____________________________________________________  (please print)       Patient signature:  ___________________________________________________             Relationship to Patient:           []  Parent    Responsible person                          []  Spouse  In case of minor or                    [] Other  _____________   Incompetent name:  __________________________________________________                               (please print)      _____________      Responsible person  In case of minor or  Incompetent signature:  _______________________________________________    Statement of Physician  My signature below affirms that prior to the time of the procedure, I have explained to the patient and/or his/her guardian, the risks and benefits involved in the proposed treatment and any reasonable alternative to the proposed treatment. I have also explained the risks and benefits involved in the refusal of the proposed treatment and have answered the patient's questions.                         Date:  ______/______/_______  Provider                      Signature:  __________________________________________________________       Time:  ___________ A.M    P.M.

## (undated) NOTE — LETTER
Date: December 15, 2022      Patient Name: Taylor Uriarte      : 1965        Thank you for choosing [id number] as your health care provider. Your physician has deemed the following medical service(s) necessary. However, your insurance plan may not pay for all of your health care and costs and may deny payment for this service. The fact that your insurance plan does not pay for an item or service does not mean you should not receive it. The purpose of this form is to help you make an informed decision about whether or not you want to receive this service(s) that may not be paid for by your insurance plan. CPT Code Description     Cost     _________ __bilateral carpal tunnel injection___  __$400___________      _________ ______________________________ _____________      _________ ______________________________ _____________      I understand that the above mentioned service(s) or supply may not be covered by my insurance company.  I agree to be financially responsible for the cost of this service or supply in the event of my insurance denies payment as a non-covered benefit.        ______________________________________________________________________  Signature of Patient or Patient's Representative  Relationship  Date    ______________________________________________________________________  Signature of Witness to signing of form   Printed Name

## (undated) NOTE — LETTER
Date: 2024      Patient Name: Philomena Gutiérrez      : 1965        Thank you for choosing [facility] as your health care provider. Your physician has deemed the following medical service(s) necessary. However, your insurance plan may not pay for all of your health care and costs and may deny payment for this service. The fact that your insurance plan does not pay for an item or service does not mean you should not receive it. The purpose of this form is to help you make an informed decision about whether or not you want to receive this service(s) that may not be paid for by your insurance plan.    CPT Code Description     Cost     Ultrasound guided left carpal tunnel injection with corticosteroid       I understand that the above mentioned service(s) or supply may not be covered by my insurance company. I agree to be financially responsible for the cost of this service or supply in the event of my insurance denies payment as a non-covered benefit.        ______________________________________________________________________  Signature of Patient or Patient's Representative  Relationship  Date    ______________________________________________________________________  Signature of Witness to signing of form   Printed Name

## (undated) NOTE — LETTER
23      Patient: Jia Persaud  : 1965 Visit date: 3/30/2023    Dear Gaye Raya,      I examined your patient in consultation today. She has a moderately severe right carpal tunnel syndrome, with recurrent symptoms post steroid injection. We will plan on right endoscopic carpal tunnel release. She has a left carpal tunnel syndrome which is now asymptomatic post steroid injection. No surgery is indicated at this point. Thank you for your kind referral. If I may answer any questions, please feel free to contact me.      Sincerely,   Naren Baker MD     CC:   No Recipients